# Patient Record
Sex: MALE | Race: NATIVE HAWAIIAN OR OTHER PACIFIC ISLANDER | URBAN - METROPOLITAN AREA
[De-identification: names, ages, dates, MRNs, and addresses within clinical notes are randomized per-mention and may not be internally consistent; named-entity substitution may affect disease eponyms.]

---

## 2019-04-18 ENCOUNTER — OFFICE VISIT (OUTPATIENT)
Dept: FAMILY MEDICINE | Facility: CLINIC | Age: 29
End: 2019-04-18
Payer: COMMERCIAL

## 2019-04-18 VITALS
DIASTOLIC BLOOD PRESSURE: 98 MMHG | SYSTOLIC BLOOD PRESSURE: 171 MMHG | HEIGHT: 66 IN | HEART RATE: 104 BPM | OXYGEN SATURATION: 96 % | TEMPERATURE: 99.2 F | BODY MASS INDEX: 50.62 KG/M2 | WEIGHT: 315 LBS

## 2019-04-18 DIAGNOSIS — F40.01 PANIC DISORDER WITH AGORAPHOBIA: ICD-10-CM

## 2019-04-18 DIAGNOSIS — E78.5 HYPERLIPIDEMIA LDL GOAL <130: ICD-10-CM

## 2019-04-18 DIAGNOSIS — I10 HYPERTENSION GOAL BP (BLOOD PRESSURE) < 140/90: Primary | ICD-10-CM

## 2019-04-18 DIAGNOSIS — F32.2 SEVERE MAJOR DEPRESSION (H): ICD-10-CM

## 2019-04-18 DIAGNOSIS — E66.01 MORBID OBESITY (H): ICD-10-CM

## 2019-04-18 DIAGNOSIS — R06.81 APNEA: ICD-10-CM

## 2019-04-18 LAB
ALBUMIN SERPL-MCNC: 3.9 G/DL (ref 3.4–5)
ALP SERPL-CCNC: 119 U/L (ref 40–150)
ALT SERPL W P-5'-P-CCNC: 85 U/L (ref 0–70)
ANION GAP SERPL CALCULATED.3IONS-SCNC: 6 MMOL/L (ref 3–14)
AST SERPL W P-5'-P-CCNC: 55 U/L (ref 0–45)
BILIRUB SERPL-MCNC: 0.5 MG/DL (ref 0.2–1.3)
BUN SERPL-MCNC: 8 MG/DL (ref 7–30)
CALCIUM SERPL-MCNC: 8.8 MG/DL (ref 8.5–10.1)
CHLORIDE SERPL-SCNC: 107 MMOL/L (ref 94–109)
CHOLEST SERPL-MCNC: 225 MG/DL
CO2 SERPL-SCNC: 26 MMOL/L (ref 20–32)
CREAT SERPL-MCNC: 0.91 MG/DL (ref 0.66–1.25)
ERYTHROCYTE [DISTWIDTH] IN BLOOD BY AUTOMATED COUNT: 14.5 % (ref 10–15)
GFR SERPL CREATININE-BSD FRML MDRD: >90 ML/MIN/{1.73_M2}
GLUCOSE SERPL-MCNC: 124 MG/DL (ref 70–99)
HCT VFR BLD AUTO: 47.4 % (ref 40–53)
HDLC SERPL-MCNC: 33 MG/DL
HGB BLD-MCNC: 15.7 G/DL (ref 13.3–17.7)
LDLC SERPL CALC-MCNC: 118 MG/DL
MCH RBC QN AUTO: 28.9 PG (ref 26.5–33)
MCHC RBC AUTO-ENTMCNC: 33.1 G/DL (ref 31.5–36.5)
MCV RBC AUTO: 87 FL (ref 78–100)
NONHDLC SERPL-MCNC: 192 MG/DL
PLATELET # BLD AUTO: 312 10E9/L (ref 150–450)
POTASSIUM SERPL-SCNC: 4.1 MMOL/L (ref 3.4–5.3)
PROT SERPL-MCNC: 8.2 G/DL (ref 6.8–8.8)
RBC # BLD AUTO: 5.44 10E12/L (ref 4.4–5.9)
SODIUM SERPL-SCNC: 139 MMOL/L (ref 133–144)
TRIGL SERPL-MCNC: 371 MG/DL
TSH SERPL DL<=0.005 MIU/L-ACNC: 2 MU/L (ref 0.4–4)
WBC # BLD AUTO: 9.5 10E9/L (ref 4–11)

## 2019-04-18 PROCEDURE — 99204 OFFICE O/P NEW MOD 45 MIN: CPT | Performed by: NURSE PRACTITIONER

## 2019-04-18 PROCEDURE — 85027 COMPLETE CBC AUTOMATED: CPT | Performed by: NURSE PRACTITIONER

## 2019-04-18 PROCEDURE — 36415 COLL VENOUS BLD VENIPUNCTURE: CPT | Performed by: NURSE PRACTITIONER

## 2019-04-18 PROCEDURE — 84443 ASSAY THYROID STIM HORMONE: CPT | Performed by: NURSE PRACTITIONER

## 2019-04-18 PROCEDURE — 80061 LIPID PANEL: CPT | Performed by: NURSE PRACTITIONER

## 2019-04-18 PROCEDURE — 80053 COMPREHEN METABOLIC PANEL: CPT | Performed by: NURSE PRACTITIONER

## 2019-04-18 RX ORDER — CLONAZEPAM 1 MG/1
.5-1 TABLET ORAL 2 TIMES DAILY PRN
Qty: 30 TABLET | Refills: 0 | Status: SHIPPED | OUTPATIENT
Start: 2019-04-18 | End: 2019-05-20

## 2019-04-18 RX ORDER — LISINOPRIL AND HYDROCHLOROTHIAZIDE 20; 25 MG/1; MG/1
1 TABLET ORAL DAILY
Qty: 30 TABLET | Refills: 1 | Status: SHIPPED | OUTPATIENT
Start: 2019-04-18 | End: 2019-06-07

## 2019-04-18 RX ORDER — PAROXETINE 20 MG/1
TABLET, FILM COATED ORAL
Qty: 63 TABLET | Refills: 0 | Status: SHIPPED | OUTPATIENT
Start: 2019-04-18 | End: 2019-05-02 | Stop reason: SINTOL

## 2019-04-18 ASSESSMENT — PAIN SCALES - GENERAL: PAINLEVEL: NO PAIN (0)

## 2019-04-18 ASSESSMENT — MIFFLIN-ST. JEOR: SCORE: 2476.63

## 2019-04-18 NOTE — PROGRESS NOTES
SUBJECTIVE:   Jeovanny Salcedo is a 28 year old male who presents to clinic today for the following   health issues:        He is here today with his momFya  They moved back in August. Grandma with terminal lung cancer  Mom is providing some of the history, he has social anxiety  He has been off of his medications  BP was never well controlled. Was on lisinopril, metoprolol, simvastatin, baby aspirin (not completely sure this is accurate)  Not diabetic  Primary care provider thought heart was enlarged. Saw cardiologist with normal workup   Family history of CAD  Concern for GLENNY. Did not have sleep study before moving  Mom notices apneic periods, a lot of snoring    History of anxiety and depression  On Fluoxetine and Clonazepam?  Anxiety around social situations. At grocery store, starts to panic, sweats a lot, raises blood pressure  Saw therapist in [place]    His older brother is disabled, more severe anxiety  They are staying with their aunt while their parents are living with grandma  Jeovanny helps care for his brother      Additional history: as documented    Reviewed  and updated as needed this visit by clinical staff  Tobacco  Allergies  Meds  Med Hx  Surg Hx  Fam Hx  Soc Hx        Reviewed and updated as needed this visit by Provider         Patient Active Problem List   Diagnosis     overweight HCC     Hypertension goal BP (blood pressure) < 140/90     Severe major depression (H)     Panic disorder with agoraphobia     History reviewed. No pertinent surgical history.    Social History     Tobacco Use     Smoking status: Never Smoker     Smokeless tobacco: Never Used   Substance Use Topics     Alcohol use: Yes     Comment: Occ.     Family History   Problem Relation Age of Onset     Hypertension Maternal Grandfather      Diabetes Maternal Grandfather      Lung Cancer Maternal Grandfather      Anxiety Disorder Brother      Cervical Cancer Paternal Grandmother          BP Readings from Last 3 Encounters:  "  04/18/19 (!) 171/98    Wt Readings from Last 3 Encounters:   04/18/19 (!) 156.5 kg (345 lb)                    ROS:  10 point ROS of systems including Constitutional, Eyes, Respiratory, Cardiovascular, Gastroenterology, Genitourinary, Integumentary, Muscularskeletal, Psychiatric were all negative except for pertinent positives noted in my HPI.      OBJECTIVE:     BP (!) 171/98 (BP Location: Right arm, Patient Position: Chair, Cuff Size: Adult Large)   Pulse 104   Temp 99.2  F (37.3  C) (Oral)   Ht 1.683 m (5' 6.25\")   Wt (!) 156.5 kg (345 lb)   SpO2 96%   BMI 55.26 kg/m    Body mass index is 55.26 kg/m .  GENERAL: healthy, alert and no distress  NECK: no adenopathy, no asymmetry, masses, or scars and thyroid normal to palpation  RESP: lungs clear to auscultation - no rales, rhonchi or wheezes  CV: regular rate and rhythm, normal S1 S2, no S3 or S4, no murmur, click or rub, no peripheral edema and peripheral pulses strong  ABDOMEN: large, nontender, bowel sounds distant  MS: no gross musculoskeletal defects noted, no edema  PSYCH: Flat affect, few words, appears anxious. Mom initially provided most of history. As visit progressed he appeared to become more comfortable speaking and gave more historical information    Diagnostic Test Results:  none     ASSESSMENT/PLAN:   1. Hypertension goal BP (blood pressure) < 140/90  - Start medication. Consider BB with tachycardia though that they may improve as we improve anxiety. Will wait until I receive previous records before starting BB, or consider in future if tachycardia does not improve  - Comprehensive metabolic panel (BMP + Alb, Alk Phos, ALT, AST, Total. Bili, TP)  - CBC with platelets  - TSH with free T4 reflex  - lisinopril-hydrochlorothiazide (PRINZIDE/ZESTORETIC) 20-25 MG tablet; Take 1 tablet by mouth daily  Dispense: 30 tablet; Refill: 1    2. overweight HCC  - Recommend exercising 150 minutes/week. Dietary modifications    3. Apnea  - SLEEP EVALUATION & " MANAGEMENT REFERRAL - ADULT -[facility] - [facility]  [phone] (Age 15 and up); Future    4. Severe major depression (H)  - Recommend paxil which tends to be more effective for anxiety, though monitor for weight gain as potential side effect. After I suggested Paxil mom thought maybe he was on that instead of Prozac  - Normally would not recommend starting benzodiazepine but his anxiety appears severe and I think he could really benefit from it. Especially while establishing with a therapist and starting on serotonin specific reuptake inhibitor. Reviewed risks and benefits including risk for addiction and habit formation. Will not be escalating dose  - PARoxetine (PAXIL) 20 MG tablet; Take 1 tablet daily for 1 week, then 2 tablets daily for 1 week then 3 tablets daily for 2 weeks.  Dispense: 63 tablet; Refill: 0  - clonazePAM (KLONOPIN) 1 MG tablet; Take 0.5-1 tablets (0.5-1 mg) by mouth 2 times daily as needed for anxiety  Dispense: 30 tablet; Refill: 0  - MENTAL HEALTH REFERRAL  - Adult; Outpatient Treatment; Individual/Couples/Family/Group Therapy/Health Psychology; Other: [facility] [phone]; We will contact you to schedule the appointment or please call with any questi...    5. Panic disorder with agoraphobia  - PARoxetine (PAXIL) 20 MG tablet; Take 1 tablet daily for 1 week, then 2 tablets daily for 1 week then 3 tablets daily for 2 weeks.  Dispense: 63 tablet; Refill: 0  - clonazePAM (KLONOPIN) 1 MG tablet; Take 0.5-1 tablets (0.5-1 mg) by mouth 2 times daily as needed for anxiety  Dispense: 30 tablet; Refill: 0  - MENTAL HEALTH REFERRAL  - Adult; Outpatient Treatment; Individual/Couples/Family/Group Therapy/Health Psychology; Other: [facility] [phone]; We will contact you to schedule the appointment or please call with any questi...    6. Hyperlipidemia LDL goal <130  - Lipid panel reflex to direct LDL Non-fasting    Mom completed ROIs  for previous records    Patient Instructions   Schedule appointment at sleep clinic    You will receive a call to schedule with a therapist.    Follow up with me in 2 weeks    Try to start exercising. It would be great to go outside on walks and get some fresh air    More than 45 minutes spent with patient, more than 50% of which was spent on counseling and coordination of care.     SLOAN Mcconnell [facility]

## 2019-04-18 NOTE — PATIENT INSTRUCTIONS
Schedule appointment at sleep clinic    You will receive a call to schedule with a therapist.    Follow up with me in 2 weeks    Try to start exercising. It would be great to go outside on walks and get some fresh air

## 2019-04-18 NOTE — LETTER
[facility]  [address] [place]  [address]  [phone]        April 22, 2019    Jeovanny Augustinson  [address] [facility] [address]        Dear Jeovanny,    The results of your recent labs are enclosed.   Your cholesterol is elevated but it is not a true fasting lab check.   Your liver enzymes are just mildly elevated. We can recheck this at your follow up appointment. Avoid any use of tylenol or alcohol. If you're able to, come fasting (nothing to eat or drink for 8 hours) so we can recheck fasting cholesterol. It is ok to drink water with your morning medications.     Please call the clinic if you have any concerns.     Results for orders placed or performed in visit on 04/18/19   Comprehensive metabolic panel (BMP + Alb, Alk Phos, ALT, AST, Total. Bili, TP)   Result Value Ref Range    Sodium 139 133 - 144 mmol/L    Potassium 4.1 3.4 - 5.3 mmol/L    Chloride 107 94 - 109 mmol/L    Carbon Dioxide 26 20 - 32 mmol/L    Anion Gap 6 3 - 14 mmol/L    Glucose 124 (H) 70 - 99 mg/dL    Urea Nitrogen 8 7 - 30 mg/dL    Creatinine 0.91 0.66 - 1.25 mg/dL    GFR Estimate >90 >60 mL/min/[1.73_m2]    GFR Estimate If Black >90 >60 mL/min/[1.73_m2]    Calcium 8.8 8.5 - 10.1 mg/dL    Bilirubin Total 0.5 0.2 - 1.3 mg/dL    Albumin 3.9 3.4 - 5.0 g/dL    Protein Total 8.2 6.8 - 8.8 g/dL    Alkaline Phosphatase 119 40 - 150 U/L    ALT 85 (H) 0 - 70 U/L    AST 55 (H) 0 - 45 U/L   CBC with platelets   Result Value Ref Range    WBC 9.5 4.0 - 11.0 10e9/L    RBC Count 5.44 4.4 - 5.9 10e12/L    Hemoglobin 15.7 13.3 - 17.7 g/dL    Hematocrit 47.4 40.0 - 53.0 %    MCV 87 78 - 100 fl    MCH 28.9 26.5 - 33.0 pg    MCHC 33.1 31.5 - 36.5 g/dL    RDW 14.5 10.0 - 15.0 %    Platelet Count 312 150 - 450 10e9/L   TSH with free T4 reflex   Result Value Ref Range    TSH 2.00 0.40 - 4.00 mU/L   Lipid panel reflex to direct LDL Non-fasting   Result Value Ref Range    Cholesterol 225 (H) <200 mg/dL     Triglycerides 371 (H) <150 mg/dL    HDL Cholesterol 33 (L) >39 mg/dL    LDL Cholesterol Calculated 118 (H) <100 mg/dL    Non HDL Cholesterol 192 (H) <130 mg/dL       If you have any questions please call the clinic at [phone].    Sincerely,    Padmini LISA CNP  LMD

## 2019-04-22 NOTE — RESULT ENCOUNTER NOTE
[address] [address]  Phone: [phone]  Fax: [phone]      04/22/19    Jeovanny Salcedo  [facility] [address] [facility] [address]      Dear Jeovanny,    The results of your recent labs are enclosed.  Your cholesterol is elevated but it is not a true fasting lab check.   Your liver enzymes are just mildly elevated. We can recheck this at your follow up appointment. Avoid any use of tylenol or alcohol. If you're able to, come fasting (nothing to eat or drink for 8 hours) so we can recheck fasting cholesterol. It is ok to drink water with your morning medications.     Please call the clinic if you have any concerns.    Sincerely,    SLOAN Mcconnell CNP    [facility]

## 2019-05-01 ENCOUNTER — TRANSFERRED RECORDS (OUTPATIENT)
Dept: HEALTH INFORMATION MANAGEMENT | Facility: CLINIC | Age: 29
End: 2019-05-01

## 2019-05-01 LAB
ALT SERPL-CCNC: 136 IU/L (ref 8–45)
AST SERPL-CCNC: 101 IU/L (ref 2–40)
CREAT SERPL-MCNC: 1.36 MG/DL (ref 0.72–1.25)
GFR SERPL CREATININE-BSD FRML MDRD: >60 ML/MIN/1.73M2
GLUCOSE SERPL-MCNC: 140 MG/DL (ref 65–100)
POTASSIUM SERPL-SCNC: 3.8 MMOL/L (ref 3.5–5)

## 2019-05-02 ENCOUNTER — OFFICE VISIT (OUTPATIENT)
Dept: FAMILY MEDICINE | Facility: CLINIC | Age: 29
End: 2019-05-02
Payer: COMMERCIAL

## 2019-05-02 VITALS
BODY MASS INDEX: 54.14 KG/M2 | HEART RATE: 99 BPM | WEIGHT: 315 LBS | SYSTOLIC BLOOD PRESSURE: 116 MMHG | DIASTOLIC BLOOD PRESSURE: 78 MMHG | TEMPERATURE: 98.6 F | OXYGEN SATURATION: 95 %

## 2019-05-02 DIAGNOSIS — F32.2 SEVERE MAJOR DEPRESSION (H): Primary | ICD-10-CM

## 2019-05-02 DIAGNOSIS — E78.2 MIXED HYPERLIPIDEMIA: ICD-10-CM

## 2019-05-02 DIAGNOSIS — I10 HYPERTENSION GOAL BP (BLOOD PRESSURE) < 140/90: ICD-10-CM

## 2019-05-02 DIAGNOSIS — K21.9 GASTROESOPHAGEAL REFLUX DISEASE, ESOPHAGITIS PRESENCE NOT SPECIFIED: ICD-10-CM

## 2019-05-02 DIAGNOSIS — F51.04 PSYCHOPHYSIOLOGICAL INSOMNIA: ICD-10-CM

## 2019-05-02 DIAGNOSIS — F40.01 PANIC DISORDER WITH AGORAPHOBIA: ICD-10-CM

## 2019-05-02 DIAGNOSIS — R74.01 ELEVATED TRANSAMINASE LEVEL: ICD-10-CM

## 2019-05-02 DIAGNOSIS — R94.31 PROLONGED Q-T INTERVAL ON ECG: ICD-10-CM

## 2019-05-02 PROCEDURE — 99215 OFFICE O/P EST HI 40 MIN: CPT | Performed by: NURSE PRACTITIONER

## 2019-05-02 RX ORDER — ZOLPIDEM TARTRATE 5 MG/1
5 TABLET ORAL
Qty: 30 TABLET | Refills: 1 | Status: SHIPPED | OUTPATIENT
Start: 2019-05-02 | End: 2019-06-07

## 2019-05-02 ASSESSMENT — ANXIETY QUESTIONNAIRES
6. BECOMING EASILY ANNOYED OR IRRITABLE: MORE THAN HALF THE DAYS
1. FEELING NERVOUS, ANXIOUS, OR ON EDGE: MORE THAN HALF THE DAYS
5. BEING SO RESTLESS THAT IT IS HARD TO SIT STILL: SEVERAL DAYS
2. NOT BEING ABLE TO STOP OR CONTROL WORRYING: SEVERAL DAYS
3. WORRYING TOO MUCH ABOUT DIFFERENT THINGS: MORE THAN HALF THE DAYS
7. FEELING AFRAID AS IF SOMETHING AWFUL MIGHT HAPPEN: NEARLY EVERY DAY
IF YOU CHECKED OFF ANY PROBLEMS ON THIS QUESTIONNAIRE, HOW DIFFICULT HAVE THESE PROBLEMS MADE IT FOR YOU TO DO YOUR WORK, TAKE CARE OF THINGS AT HOME, OR GET ALONG WITH OTHER PEOPLE: VERY DIFFICULT
GAD7 TOTAL SCORE: 13

## 2019-05-02 ASSESSMENT — PATIENT HEALTH QUESTIONNAIRE - PHQ9
SUM OF ALL RESPONSES TO PHQ QUESTIONS 1-9: 15
5. POOR APPETITE OR OVEREATING: MORE THAN HALF THE DAYS

## 2019-05-02 ASSESSMENT — PAIN SCALES - GENERAL: PAINLEVEL: NO PAIN (0)

## 2019-05-02 NOTE — PATIENT INSTRUCTIONS
If you develop chest pain or shortness of breath with physical activity (like walking stairs) please let me know and I will order a stress test    I sent a prescription for Prilosec. Take 1 capsule every morning 30 minutes before breakfast. Take this for 25 days, then take every other day until its done and then stop     Please schedule a lab only appointment for fasting lab within the next 1-2 weeks  Fasting means nothing to eat or drink for 8 hours. Water is ok with your medications, otherwise nothing else to drink    I am going to wean you off of Paxil because I'm concerned it has changed your heart rhythm  I started you on a new medication called Zoloft which is safer  Take 1/2 table Zoloft daily for 1 week then increase to 1 tablet daily  Take 2 tablets Paxil at bedtime for 3 days then 1 tablet at bedtime for 3 days, then stop     If you keep quitting the sweats after taking Paxil at night (and you will stop this medication in 6 days) call me and I may switch you to a different blood pressure medication    Follow up in 1 month          GERD (Adult)    The esophagus is a tube that carries food from the mouth to the stomach. A valve (the LES, lower esophageal sphincter) at the lower end of the esophagus prevents stomach acid from flowing upward. When this valve doesn't work properly, stomach contents may repeatedly flow back up (reflux) into the esophagus. This is called gastroesophageal reflux disease (GERD). GERD can irritate the esophagus. It can cause problems with pain, swallowing or breathing. In severe cases, GERD can cause recurrent pneumonia (from aspiration or breathing in particles) or other serious problems.  Symptoms of reflux include burning, pressure or sharp pain in the upper abdomen or mid to lower chest. The pain can spread to the neck, back, or shoulder. There may be belching, an acid taste in the back of the throat, chronic cough, or sore throat, or hoarseness. GERD symptoms often occur during  "the day after a big meal. They can also occur at night when lying down.   Home care  Lifestyle changes can help reduce symptoms. If needed, your healthcare provider may prescribe medicines. Symptoms often improve with treatment, but if treatment is stopped, the symptoms often return after a few months. So most persons with GERD will need to continue treatment or get treatment on and off.  Lifestyle changes    Limit or avoid fatty, fried, and spicy foods, as well as coffee, chocolate, mint, and foods with high acid content such as tomatoes and citrus fruit and juices (orange, grapefruit, lemon).    Don t eat large meals, especially at night. Frequent, smaller meals are best. Don't lie down right after eating. And don t eat anything 3 hours before going to bed.    Don't drink alcohol or smoke. As much as possible, stay away from second hand smoke.    If you are overweight, losing weight will reduce symptoms.     Don't wear tight clothing around your stomach area.    If your symptoms occur during sleep, use a foam wedge to elevate your upper body (not just your head.) Or, place 4\" blocks under the head of your bed. Or use 2 bed risers under your bedframe.  Medicines  If needed, medicines can help relieve the symptoms of GERD and prevent damage to the esophagus. Discuss a medicine plan with your healthcare provider. This may include one or more of the following medicines:    Antacids to help neutralize the normal acids in your stomach.    Acid blockers (Histamine or H2 blockers) to decrease acid production.    Acid inhibitors (proton pump inhibitors PPIs) to decrease acid production in a different way than the blockers. They may work better, but can take a little longer to take effect.  Take an antacid 30 to 60 minutes after eating and at bedtime, but not at the same time as an acid blocker.Try not to take medicines such as ibuprofen and aspirin. If you are taking aspirin for your heart or other medical reasons, talk to " your healthcare provider about stopping it.  Follow-up care  Follow up with your healthcare provider or as advised by our staff.  When to seek medical advice  Call your healthcare provider if any of the following occur:    Stomach pain gets worse or moves to the lower right abdomen (appendix area)    Chest pain appears or gets worse, or spreads to the back, neck, shoulder, or arm    An over-the-counter trial of medicine doesn't relieve your symptoms    Weight loss that can't be explained    Trouble or pain swallowing    Frequent vomiting (can t keep down liquids)    Blood in the stool or vomit (red or black in color)    Feeling weak or dizzy    Fever of 100.4 F (38 C) or higher, or as directed by your healthcare provider  Date Last Reviewed: 3/1/2018    3458-9192 [employer]. [address] [place] [address], [place] [address]. All rights reserved. This information is not intended as a substitute for professional medical care. Always follow your healthcare professional's instructions.           Patient Education     Lifestyle Changes for Controlling GERD  When you have GERD, stomach acid feels as if it s backing up toward your mouth. Whether or not you take medicine to control your GERD, your symptoms can often be improved with lifestyle changes. Talk to your healthcare provider about the following suggestions. These suggestions may help you get relief from your symptoms.      Raise your head  Reflux is more likely to strike when you re lying down flat, because stomach fluid can flow backward more easily. Raising the head of your bed 4 to 6 inches can help. To do this:    Slide blocks or books under the legs at the head of your bed. Or, place a wedge under the mattress. [employer] can make a suitable wedge for you. The wedge should run from your waist to the top of your head.    Don t just prop your head on several pillows. This increases pressure on your stomach. It can make GERD worse.  Watch your eating  habits  Certain foods may increase the acid in your stomach or relax the lower esophageal sphincter. This makes GERD more likely. It s best to avoid the following if they cause you symptoms:    Coffee, tea, and carbonated drinks (with and without caffeine)    Fatty, fried, or spicy food    Mint, chocolate, onions, and tomatoes    Peppermint    Any other foods that seem to irritate your stomach or cause you pain  Relieve the pressure  Tips include the following:    Eat smaller meals, even if you have to eat more often.    Don t lie down right after you eat. Wait a few hours for your stomach to empty.    Avoid tight belts and tight-fitting clothes.    Lose excess weight.  Tobacco and alcohol  Avoid smoking tobacco and drinking alcohol. They can make GERD symptoms worse.  Date Last Reviewed: 7/1/2016 2000-2018 [employer]. [address] [place] [address], [place] [address]. All rights reserved. This information is not intended as a substitute for professional medical care. Always follow your healthcare professional's instructions.

## 2019-05-02 NOTE — PROGRESS NOTES
"  SUBJECTIVE:   Jeovanny Salcedo is a 28 year old male who presents to clinic today for the following   health issues:      Depression and Anxiety Follow-Up    Status since last visit: Improved     Other associated symptoms:None    Complicating factors:     Significant life event: No     Current substance abuse: None    PHQ 5/2/2019   PHQ-9 Total Score 15   Q9: Thoughts of better off dead/self-harm past 2 weeks Several days     GIBSON-7 SCORE 5/2/2019   Total Score 13         Amount of exercise or physical activity: None    Problems taking medications regularly: Yes,      Medication side effects: Cold sweats    Diet: regular (no restrictions) and but cut out pop    I first met Jeovanny 4/18/19 to establish care  Started on Paxil for depression and anxiety and Klonopin BID which he had been on previously  Transaminases were mildly elevated at last visit, Triglycerides 371. Was not a true fasting lab  Does not drink alcohol  He was started on lisinopril-hydrochlorothiazide for HTN. Has developed \"cold sweats\" and not sure if this is a side effect of medication  He was seen at ED yesterday for chest pain. Developed after eating \"unhealthy lungch\"  ECG showed QTc 559, other normal  Chest xray normal  He reports chest pain resolved after the GI cocktail and has not returned since then  He has walked stairs today without SOB  Maternal grandfather had heart disease. Not sure at what age  He was given Prilosec at discharge but did not fill  He had an appointment with a therapist, Bonnie Newberry  Next visit schedule tomorrow  He asks for something for sleep  Was on ambien in the past which was helpful  Tolerated without SE  Tried taking 1/2 tablet klonopin in the morning and afternoon but find better relief with taking 1 tablet in afternoon        Additional history: as documented    Reviewed  and updated as needed this visit by clinical staff  Tobacco  Allergies  Meds  Med Hx  Surg Hx  Fam Hx  Soc Hx        Reviewed and " updated as needed this visit by Provider         Patient Active Problem List   Diagnosis     overweight HCC     Hypertension goal BP (blood pressure) < 140/90     Severe major depression (H)     Panic disorder with agoraphobia     History reviewed. No pertinent surgical history.    Social History     Tobacco Use     Smoking status: Never Smoker     Smokeless tobacco: Never Used   Substance Use Topics     Alcohol use: Yes     Comment: Occ.     Family History   Problem Relation Age of Onset     Hypertension Maternal Grandfather      Diabetes Maternal Grandfather      Lung Cancer Maternal Grandfather      Anxiety Disorder Brother      Cervical Cancer Paternal Grandmother          BP Readings from Last 3 Encounters:   05/02/19 116/78   04/18/19 (!) 171/98    Wt Readings from Last 3 Encounters:   05/02/19 (!) 153.3 kg (338 lb)   04/18/19 (!) 156.5 kg (345 lb)                    ROS:  Constitutional, HEENT, cardiovascular, pulmonary, gi and gu systems are negative, except as otherwise noted.    OBJECTIVE:     /78 (BP Location: Right arm, Patient Position: Chair, Cuff Size: Adult Regular)   Pulse 99   Temp 98.6  F (37  C) (Oral)   Wt (!) 153.3 kg (338 lb)   SpO2 95%   BMI 54.14 kg/m    Body mass index is 54.14 kg/m .  GENERAL: healthy, alert and no distress  RESP: lungs clear to auscultation - no rales, rhonchi or wheezes  CV: regular rate and rhythm, normal S1 S2, no S3 or S4, no murmur, click or rub, no peripheral edema and peripheral pulses strong  ABDOMEN: large, soft, no tenderness  PSYCH: mentation normal, flat affect, appears less anxious than previous, he appears more comfortable in clinic today    Diagnostic Test Results:  Results for orders placed or performed in visit on 04/18/19   Comprehensive metabolic panel (BMP + Alb, Alk Phos, ALT, AST, Total. Bili, TP)   Result Value Ref Range    Sodium 139 133 - 144 mmol/L    Potassium 4.1 3.4 - 5.3 mmol/L    Chloride 107 94 - 109 mmol/L    Carbon Dioxide 26  20 - 32 mmol/L    Anion Gap 6 3 - 14 mmol/L    Glucose 124 (H) 70 - 99 mg/dL    Urea Nitrogen 8 7 - 30 mg/dL    Creatinine 0.91 0.66 - 1.25 mg/dL    GFR Estimate >90 >60 mL/min/[1.73_m2]    GFR Estimate If Black >90 >60 mL/min/[1.73_m2]    Calcium 8.8 8.5 - 10.1 mg/dL    Bilirubin Total 0.5 0.2 - 1.3 mg/dL    Albumin 3.9 3.4 - 5.0 g/dL    Protein Total 8.2 6.8 - 8.8 g/dL    Alkaline Phosphatase 119 40 - 150 U/L    ALT 85 (H) 0 - 70 U/L    AST 55 (H) 0 - 45 U/L   CBC with platelets   Result Value Ref Range    WBC 9.5 4.0 - 11.0 10e9/L    RBC Count 5.44 4.4 - 5.9 10e12/L    Hemoglobin 15.7 13.3 - 17.7 g/dL    Hematocrit 47.4 40.0 - 53.0 %    MCV 87 78 - 100 fl    MCH 28.9 26.5 - 33.0 pg    MCHC 33.1 31.5 - 36.5 g/dL    RDW 14.5 10.0 - 15.0 %    Platelet Count 312 150 - 450 10e9/L   TSH with free T4 reflex   Result Value Ref Range    TSH 2.00 0.40 - 4.00 mU/L   Lipid panel reflex to direct LDL Non-fasting   Result Value Ref Range    Cholesterol 225 (H) <200 mg/dL    Triglycerides 371 (H) <150 mg/dL    HDL Cholesterol 33 (L) >39 mg/dL    LDL Cholesterol Calculated 118 (H) <100 mg/dL    Non HDL Cholesterol 192 (H) <130 mg/dL       ASSESSMENT/PLAN:       ICD-10-CM    1. Severe major depression (H) F32.2 sertraline (ZOLOFT) 50 MG tablet   2. Panic disorder with agoraphobia F40.01    3. Hypertension goal BP (blood pressure) < 140/90 I10    4. Elevated transaminase level R74.0 Comprehensive metabolic panel (BMP + Alb, Alk Phos, ALT, AST, Total. Bili, TP)     **Hepatitis C Screen Reflex to RNA FUTURE anytime     Hepatitis B core antibody     Hepatitis B Surface Antibody     Hepatitis B surface antigen   5. Prolonged Q-T interval on ECG R94.31    6. Mixed hyperlipidemia E78.2 Lipid panel reflex to direct LDL Fasting   7. Gastroesophageal reflux disease, esophagitis presence not specified K21.9 omeprazole (PRILOSEC) 20 MG DR capsule   8. Psychophysiological insomnia F51.04 zolpidem (AMBIEN) 5 MG tablet     Suspect elevated  "transaminases related to fatty liver  Discussed importance of healthy diet, weight loss  Consider US in future  I suspect \"cold sweats\" r/t Paxil. Need to discontinue d/t prolonged QT and will recheck ECG at f/u  No further chest pain and will closely monitor. Consider stress testing in future    Patient instructions:  If you develop chest pain or shortness of breath with physical activity (like walking stairs) please let me know and I will order a stress test    I sent a prescription for Prilosec. Take 1 capsule every morning 30 minutes before breakfast. Take this for 25 days, then take every other day until its done and then stop     Please schedule a lab only appointment for fasting lab within the next 1-2 weeks  Fasting means nothing to eat or drink for 8 hours. Water is ok with your medications, otherwise nothing else to drink    I am going to wean you off of Paxil because I'm concerned it has changed your heart rhythm  I started you on a new medication called Zoloft which is safer  Take 1/2 table Zoloft daily for 1 week then increase to 1 tablet daily  Take 2 tablets Paxil at bedtime for 3 days then 1 tablet at bedtime for 3 days, then stop     If you keep quitting the sweats after taking Paxil at night (and you will stop this medication in 6 days) call me and I may switch you to a different blood pressure medication    Follow up in 1 month    More than 45 minutes spent with patient, more than 50% of which was spent on counseling and coordination of care.       SLOAN Mcconnell [facility]      "

## 2019-05-03 ASSESSMENT — ANXIETY QUESTIONNAIRES: GAD7 TOTAL SCORE: 13

## 2019-05-20 DIAGNOSIS — F32.2 SEVERE MAJOR DEPRESSION (H): ICD-10-CM

## 2019-05-20 DIAGNOSIS — E78.2 MIXED HYPERLIPIDEMIA: ICD-10-CM

## 2019-05-20 DIAGNOSIS — F40.01 PANIC DISORDER WITH AGORAPHOBIA: ICD-10-CM

## 2019-05-20 DIAGNOSIS — R74.01 ELEVATED TRANSAMINASE LEVEL: ICD-10-CM

## 2019-05-20 LAB
ALBUMIN SERPL-MCNC: 3.9 G/DL (ref 3.4–5)
ALP SERPL-CCNC: 129 U/L (ref 40–150)
ALT SERPL W P-5'-P-CCNC: 73 U/L (ref 0–70)
ANION GAP SERPL CALCULATED.3IONS-SCNC: 9 MMOL/L (ref 3–14)
AST SERPL W P-5'-P-CCNC: 40 U/L (ref 0–45)
BILIRUB SERPL-MCNC: 0.3 MG/DL (ref 0.2–1.3)
BUN SERPL-MCNC: 15 MG/DL (ref 7–30)
CALCIUM SERPL-MCNC: 9.2 MG/DL (ref 8.5–10.1)
CHLORIDE SERPL-SCNC: 107 MMOL/L (ref 94–109)
CHOLEST SERPL-MCNC: 221 MG/DL
CO2 SERPL-SCNC: 23 MMOL/L (ref 20–32)
CREAT SERPL-MCNC: 1.06 MG/DL (ref 0.66–1.25)
GFR SERPL CREATININE-BSD FRML MDRD: >90 ML/MIN/{1.73_M2}
GLUCOSE SERPL-MCNC: 98 MG/DL (ref 70–99)
HDLC SERPL-MCNC: 38 MG/DL
LDLC SERPL CALC-MCNC: 146 MG/DL
NONHDLC SERPL-MCNC: 183 MG/DL
POTASSIUM SERPL-SCNC: 3.9 MMOL/L (ref 3.4–5.3)
PROT SERPL-MCNC: 8.3 G/DL (ref 6.8–8.8)
SODIUM SERPL-SCNC: 139 MMOL/L (ref 133–144)
TRIGL SERPL-MCNC: 186 MG/DL

## 2019-05-20 PROCEDURE — 87340 HEPATITIS B SURFACE AG IA: CPT | Performed by: NURSE PRACTITIONER

## 2019-05-20 PROCEDURE — 80053 COMPREHEN METABOLIC PANEL: CPT | Performed by: NURSE PRACTITIONER

## 2019-05-20 PROCEDURE — 86803 HEPATITIS C AB TEST: CPT | Performed by: NURSE PRACTITIONER

## 2019-05-20 PROCEDURE — 86706 HEP B SURFACE ANTIBODY: CPT | Performed by: NURSE PRACTITIONER

## 2019-05-20 PROCEDURE — 86704 HEP B CORE ANTIBODY TOTAL: CPT | Performed by: NURSE PRACTITIONER

## 2019-05-20 PROCEDURE — 80061 LIPID PANEL: CPT | Performed by: NURSE PRACTITIONER

## 2019-05-20 PROCEDURE — 36415 COLL VENOUS BLD VENIPUNCTURE: CPT | Performed by: NURSE PRACTITIONER

## 2019-05-20 RX ORDER — CLONAZEPAM 1 MG/1
TABLET ORAL
Qty: 30 TABLET | Refills: 0 | Status: SHIPPED | OUTPATIENT
Start: 2019-05-20 | End: 2019-06-07

## 2019-05-20 NOTE — LETTER
[facility]  [address] [facility] [place]  [address]  [phone]        May 21, 2019    Jeovanny Augustinson  [address] [facility] [address]        Dear Jeovanny,    The results of your recent labs are enclosed.   Your liver enzyme has improved and has nearly normalized.   Your cholesterol looks better now that we checked a true fasting lab. It is still elevated though. Work on eating less processed sugar, saturated fats and increase exercise.   You are immune to hepatitis B. Either through previous vaccination or exposure. Follow up with me early June     Please call the clinic if you have any concerns.     Results for orders placed or performed in visit on 05/20/19   Hepatitis B surface antigen   Result Value Ref Range    Hep B Surface Agn Nonreactive NR^Nonreactive   Hepatitis B Surface Antibody   Result Value Ref Range    Hepatitis B Surface Antibody 13.66 (H) <8.00 m[IU]/mL   Hepatitis B core antibody   Result Value Ref Range    Hepatitis B Core Rosie Nonreactive NR^Nonreactive   **Hepatitis C Screen Reflex to RNA FUTURE anytime   Result Value Ref Range    Hepatitis C Antibody Nonreactive NR^Nonreactive   Lipid panel reflex to direct LDL Fasting   Result Value Ref Range    Cholesterol 221 (H) <200 mg/dL    Triglycerides 186 (H) <150 mg/dL    HDL Cholesterol 38 (L) >39 mg/dL    LDL Cholesterol Calculated 146 (H) <100 mg/dL    Non HDL Cholesterol 183 (H) <130 mg/dL   Comprehensive metabolic panel (BMP + Alb, Alk Phos, ALT, AST, Total. Bili, TP)   Result Value Ref Range    Sodium 139 133 - 144 mmol/L    Potassium 3.9 3.4 - 5.3 mmol/L    Chloride 107 94 - 109 mmol/L    Carbon Dioxide 23 20 - 32 mmol/L    Anion Gap 9 3 - 14 mmol/L    Glucose 98 70 - 99 mg/dL    Urea Nitrogen 15 7 - 30 mg/dL    Creatinine 1.06 0.66 - 1.25 mg/dL    GFR Estimate >90 >60 mL/min/[1.73_m2]    GFR Estimate If Black >90 >60 mL/min/[1.73_m2]    Calcium 9.2 8.5 - 10.1 mg/dL    Bilirubin Total 0.3 0.2 - 1.3  mg/dL    Albumin 3.9 3.4 - 5.0 g/dL    Protein Total 8.3 6.8 - 8.8 g/dL    Alkaline Phosphatase 129 40 - 150 U/L    ALT 73 (H) 0 - 70 U/L    AST 40 0 - 45 U/L       If you have any questions please call the clinic at [phone].    Sincerely,    Padmini LISA CNP  LMD

## 2019-05-20 NOTE — TELEPHONE ENCOUNTER
Requested Prescriptions   Pending Prescriptions Disp Refills     clonazePAM (KLONOPIN) 1 MG tablet [Pharmacy Med Name: CLONAZEPAM 1MG TABLETS] 30 tablet 0     Sig: TAKE 1/2 TO 1 TABLET BY MOUTH TWICE DAILY AS NEEDED FOR ANXIETY       There is no refill protocol information for this order        Routing to Jung.  Brenna Browne MA

## 2019-05-21 LAB
HBV CORE AB SERPL QL IA: NONREACTIVE
HBV SURFACE AB SERPL IA-ACNC: 13.66 M[IU]/ML
HBV SURFACE AG SERPL QL IA: NONREACTIVE
HCV AB SERPL QL IA: NONREACTIVE

## 2019-05-21 NOTE — RESULT ENCOUNTER NOTE
[address] [place] [address]  Phone: [phone]      05/21/19    Jeovanny Salcedo  [address] [place] [address]      Dear Jeovanny,    The results of your recent labs are enclosed.  Your liver enzyme has improved and has nearly normalized.  Your cholesterol looks better now that we checked a true fasting lab. It is still elevated though. Work on eating less processed sugar, saturated fats and increase exercise.  You are immune to hepatitis B. Either through previous vaccination or exposure. Follow up with me early June    Please call the clinic if you have any concerns.    Sincerely,    SLOAN Mcconnell CNP    Your [facility] Care Team

## 2019-05-30 DIAGNOSIS — F32.2 SEVERE MAJOR DEPRESSION (H): ICD-10-CM

## 2019-05-30 DIAGNOSIS — F40.01 PANIC DISORDER WITH AGORAPHOBIA: ICD-10-CM

## 2019-05-30 NOTE — TELEPHONE ENCOUNTER
Routing refill request to provider for review/approval because:  Drug not on the [facility] refill protocol       Danyell Cuello RN  [facility]

## 2019-05-30 NOTE — TELEPHONE ENCOUNTER
Requested Prescriptions   Pending Prescriptions Disp Refills     clonazePAM (KLONOPIN) 1 MG tablet [Pharmacy Med Name: CLONAZEPAM 1MG TABLETS] 30 tablet 0     Sig: TAKE ONE-HALF TO ONE TABLET BY MOUTH TWICE DAILY AS NEEDED FOR ANXIETY       There is no refill protocol information for this order        Last Written Prescription Date:  5/20/19  Last Fill Quantity: 30,  # refills: 0   Last office visit: 5/2/2019 with prescribing provider:  Jung     Future Office Visit:

## 2019-05-31 RX ORDER — CLONAZEPAM 1 MG/1
TABLET ORAL
Qty: 30 TABLET | Refills: 0 | OUTPATIENT
Start: 2019-05-31

## 2019-05-31 NOTE — TELEPHONE ENCOUNTER
Refill request denied  This was last refilled 5/20/19 and 30 tablets was supposed to be a 1 month supply  As we discussed at your visit, this medication is to be used sparingly while we wait for daily medication to reach therapeutic level  Due to follow up in clinic next week and we can discuss this further

## 2019-06-05 DIAGNOSIS — K21.9 GASTROESOPHAGEAL REFLUX DISEASE, ESOPHAGITIS PRESENCE NOT SPECIFIED: ICD-10-CM

## 2019-06-05 NOTE — TELEPHONE ENCOUNTER
"Requested Prescriptions   Pending Prescriptions Disp Refills     omeprazole (PRILOSEC) 20 MG DR capsule [Pharmacy Med Name: OMEPRAZOLE 20MG CAPSULES] 30 capsule 0     Sig: TAKE ONE CAPSULE BY MOUTH EVERY DAY   Last Written Prescription Date:  5/2/19  Last Fill Quantity: 30,  # refills: 0   Last office visit: 5/2/2019 with prescribing provider:     Future Office Visit:   Next 5 appointments (look out 90 days)    Jun 07, 2019  1:20 PM CDT  Office Visit with SLOAN Purcell CNP  [facility] ([facility] [address] [place] [id number]  [phone]             PPI Protocol Passed - 6/5/2019  2:27 PM        Passed - Not on Clopidogrel (unless Pantoprazole ordered)        Passed - No diagnosis of osteoporosis on record        Passed - Recent (12 mo) or future (30 days) visit within the authorizing provider's specialty     Patient had office visit in the last 12 months or has a visit in the next 30 days with authorizing provider or within the authorizing provider's specialty.  See \"Patient Info\" tab in inbasket, or \"Choose Columns\" in Meds & Orders section of the refill encounter.              Passed - Medication is active on med list        Passed - Patient is age 18 or older          "

## 2019-06-07 ENCOUNTER — OFFICE VISIT (OUTPATIENT)
Dept: FAMILY MEDICINE | Facility: CLINIC | Age: 29
End: 2019-06-07
Payer: COMMERCIAL

## 2019-06-07 VITALS
OXYGEN SATURATION: 95 % | HEART RATE: 92 BPM | WEIGHT: 315 LBS | SYSTOLIC BLOOD PRESSURE: 119 MMHG | TEMPERATURE: 97.8 F | DIASTOLIC BLOOD PRESSURE: 71 MMHG | BODY MASS INDEX: 54.3 KG/M2

## 2019-06-07 DIAGNOSIS — F51.04 PSYCHOPHYSIOLOGICAL INSOMNIA: ICD-10-CM

## 2019-06-07 DIAGNOSIS — F32.2 SEVERE MAJOR DEPRESSION (H): ICD-10-CM

## 2019-06-07 DIAGNOSIS — I10 HYPERTENSION GOAL BP (BLOOD PRESSURE) < 140/90: ICD-10-CM

## 2019-06-07 DIAGNOSIS — R94.31 PROLONGED Q-T INTERVAL ON ECG: Primary | ICD-10-CM

## 2019-06-07 DIAGNOSIS — E66.01 MORBID OBESITY (H): ICD-10-CM

## 2019-06-07 DIAGNOSIS — F40.01 PANIC DISORDER WITH AGORAPHOBIA: ICD-10-CM

## 2019-06-07 PROCEDURE — 93000 ELECTROCARDIOGRAM COMPLETE: CPT | Performed by: NURSE PRACTITIONER

## 2019-06-07 PROCEDURE — 99214 OFFICE O/P EST MOD 30 MIN: CPT | Performed by: NURSE PRACTITIONER

## 2019-06-07 RX ORDER — SERTRALINE HYDROCHLORIDE 100 MG/1
100 TABLET, FILM COATED ORAL DAILY
Qty: 90 TABLET | Refills: 1 | Status: SHIPPED | OUTPATIENT
Start: 2019-06-07 | End: 2019-10-10

## 2019-06-07 RX ORDER — ZOLPIDEM TARTRATE 5 MG/1
5 TABLET ORAL
Qty: 30 TABLET | Refills: 2 | Status: SHIPPED | OUTPATIENT
Start: 2019-06-07 | End: 2019-10-10

## 2019-06-07 RX ORDER — CLONAZEPAM 1 MG/1
1 TABLET ORAL 2 TIMES DAILY PRN
Qty: 60 TABLET | Refills: 2 | Status: SHIPPED | OUTPATIENT
Start: 2019-06-07 | End: 2019-09-04

## 2019-06-07 RX ORDER — LISINOPRIL AND HYDROCHLOROTHIAZIDE 20; 25 MG/1; MG/1
1 TABLET ORAL DAILY
Qty: 90 TABLET | Refills: 1 | Status: SHIPPED | OUTPATIENT
Start: 2019-06-07 | End: 2019-10-10

## 2019-06-07 ASSESSMENT — ANXIETY QUESTIONNAIRES
3. WORRYING TOO MUCH ABOUT DIFFERENT THINGS: SEVERAL DAYS
5. BEING SO RESTLESS THAT IT IS HARD TO SIT STILL: SEVERAL DAYS
GAD7 TOTAL SCORE: 6
2. NOT BEING ABLE TO STOP OR CONTROL WORRYING: SEVERAL DAYS
IF YOU CHECKED OFF ANY PROBLEMS ON THIS QUESTIONNAIRE, HOW DIFFICULT HAVE THESE PROBLEMS MADE IT FOR YOU TO DO YOUR WORK, TAKE CARE OF THINGS AT HOME, OR GET ALONG WITH OTHER PEOPLE: SOMEWHAT DIFFICULT
6. BECOMING EASILY ANNOYED OR IRRITABLE: NOT AT ALL
1. FEELING NERVOUS, ANXIOUS, OR ON EDGE: SEVERAL DAYS
7. FEELING AFRAID AS IF SOMETHING AWFUL MIGHT HAPPEN: SEVERAL DAYS

## 2019-06-07 ASSESSMENT — PAIN SCALES - GENERAL: PAINLEVEL: NO PAIN (0)

## 2019-06-07 ASSESSMENT — PATIENT HEALTH QUESTIONNAIRE - PHQ9
SUM OF ALL RESPONSES TO PHQ QUESTIONS 1-9: 7
5. POOR APPETITE OR OVEREATING: SEVERAL DAYS

## 2019-06-07 NOTE — PATIENT INSTRUCTIONS
I refilled your Klonopin. 60 tablets is to last you a month so do not take more than 1 tablet twice daily    I increased your sertraline (Zoloft) to 100 mg    I stopped your omeprazole (Prilosec). Try to avoid eating big, greasy meals to avoid chest pain/heart burn. Do not lay down within 2 hours after eating. If you develop heart burn you could take TUMS or Zantac which you can buy over the counter.     Follow up with me in 3 months

## 2019-06-07 NOTE — TELEPHONE ENCOUNTER
Routing refill request to provider for review/approval because:  DX is not on problem list.  Beverly Romeo RN [facility] Triage.

## 2019-06-07 NOTE — PROGRESS NOTES
"Subjective     Jeovanny Salcedo is a 28 year old male who presents to clinic today for the following health issues:    HPI     At our last visit Paxil was discontinued d/t prolonged QTc interval seen on ECG  Started on sertraline approximately 4 weeks ago  He had been experiencing \"night sweats\" since starting Paxil and lisinopril-hydrochlorothiazide   This symptom has resolved since stopping Paxil  Did not like Buspar. Caused dizziness, \"heavy head\", \"off balance\"    He was previously on Klonopin for anxiety which was ordered by previous provider and was helpful for him    Started on PPI for suspect GERD after ED visit for chest pain  He ran out of medication a few days ago  No more chest pain or heart burn since being seen in ED          Patient Active Problem List   Diagnosis     overweight HCC     Hypertension goal BP (blood pressure) < 140/90     Severe major depression (H)     Panic disorder with agoraphobia     History reviewed. No pertinent surgical history.    Social History     Tobacco Use     Smoking status: Never Smoker     Smokeless tobacco: Never Used   Substance Use Topics     Alcohol use: Yes     Comment: Occ.     Family History   Problem Relation Age of Onset     Hypertension Maternal Grandfather      Diabetes Maternal Grandfather      Lung Cancer Maternal Grandfather      Anxiety Disorder Brother      Cervical Cancer Paternal Grandmother              Reviewed and updated as needed this visit by Provider         Review of Systems   ROS COMP: Constitutional, HEENT, cardiovascular, pulmonary, gi and gu systems are negative, except as otherwise noted.      Objective    /71 (BP Location: Right arm, Patient Position: Chair, Cuff Size: Adult Large)   Pulse 92   Temp 97.8  F (36.6  C) (Oral)   Wt (!) 153.8 kg (339 lb)   SpO2 95%   BMI 54.30 kg/m    Body mass index is 54.3 kg/m .  Physical Exam   GENERAL: healthy, alert and no distress  RESP: lungs clear to auscultation - no rales, rhonchi or " wheezes  CV: regular rate and rhythm, normal S1 S2, no S3 or S4, no murmur, click or rub, no peripheral edema and peripheral pulses strong  PSYCH: flat affect, appears anxious, appears to be reliable historian, makes    Diagnostic Test Results:  Labs reviewed in Epic  Results for orders placed or performed in visit on 05/20/19   Hepatitis B surface antigen   Result Value Ref Range    Hep B Surface Agn Nonreactive NR^Nonreactive   Hepatitis B Surface Antibody   Result Value Ref Range    Hepatitis B Surface Antibody 13.66 (H) <8.00 m[IU]/mL   Hepatitis B core antibody   Result Value Ref Range    Hepatitis B Core Rosie Nonreactive NR^Nonreactive   **Hepatitis C Screen Reflex to RNA FUTURE anytime   Result Value Ref Range    Hepatitis C Antibody Nonreactive NR^Nonreactive   Lipid panel reflex to direct LDL Fasting   Result Value Ref Range    Cholesterol 221 (H) <200 mg/dL    Triglycerides 186 (H) <150 mg/dL    HDL Cholesterol 38 (L) >39 mg/dL    LDL Cholesterol Calculated 146 (H) <100 mg/dL    Non HDL Cholesterol 183 (H) <130 mg/dL   Comprehensive metabolic panel (BMP + Alb, Alk Phos, ALT, AST, Total. Bili, TP)   Result Value Ref Range    Sodium 139 133 - 144 mmol/L    Potassium 3.9 3.4 - 5.3 mmol/L    Chloride 107 94 - 109 mmol/L    Carbon Dioxide 23 20 - 32 mmol/L    Anion Gap 9 3 - 14 mmol/L    Glucose 98 70 - 99 mg/dL    Urea Nitrogen 15 7 - 30 mg/dL    Creatinine 1.06 0.66 - 1.25 mg/dL    GFR Estimate >90 >60 mL/min/[1.73_m2]    GFR Estimate If Black >90 >60 mL/min/[1.73_m2]    Calcium 9.2 8.5 - 10.1 mg/dL    Bilirubin Total 0.3 0.2 - 1.3 mg/dL    Albumin 3.9 3.4 - 5.0 g/dL    Protein Total 8.3 6.8 - 8.8 g/dL    Alkaline Phosphatase 129 40 - 150 U/L    ALT 73 (H) 0 - 70 U/L    AST 40 0 - 45 U/L           Assessment & Plan       ICD-10-CM    1. Prolonged Q-T interval on ECG R94.31 EKG 12-lead complete w/read - Clinics   2. Severe major depression (H) F32.2 clonazePAM (KLONOPIN) 1 MG tablet     sertraline (ZOLOFT) 100  "MG tablet   3. Panic disorder with agoraphobia F40.01 clonazePAM (KLONOPIN) 1 MG tablet   4. overweight HCC E66.01    5. Hypertension goal BP (blood pressure) < 140/90 I10 lisinopril-hydrochlorothiazide (PRINZIDE/ZESTORETIC) 20-25 MG tablet   6. Psychophysiological insomnia F51.04 zolpidem (AMBIEN) 5 MG tablet        BMI:   Estimated body mass index is 54.3 kg/m  as calculated from the following:    Height as of 4/18/19: 1.683 m (5' 6.25\").    Weight as of this encounter: 153.8 kg (339 lb).   Weight management plan: Discussed healthy diet and exercise guidelines    Will continue with Klonopin. He is aware of potential side effects. Will need CSA at next visit  Recommend further increase in serotonin specific reuptake inhibitor  Prolonged QTc interval has resolved. Like from paroxetine  Will stop prilosec as he is no longer taking      Patient Instructions   I refilled your Klonopin. 60 tablets is to last you a month so do not take more than 1 tablet twice daily    I increased your sertraline (Zoloft) to 100 mg    I stopped your omeprazole (Prilosec). Try to avoid eating big, greasy meals to avoid chest pain/heart burn. Do not lay down within 2 hours after eating. If you develop heart burn you could take TUMS or Zantac which you can buy over the counter.     Follow up with me in 3 months        SLOAN Mcconnell [facility]        "

## 2019-06-08 ASSESSMENT — ANXIETY QUESTIONNAIRES: GAD7 TOTAL SCORE: 6

## 2019-06-12 DIAGNOSIS — I10 HYPERTENSION GOAL BP (BLOOD PRESSURE) < 140/90: ICD-10-CM

## 2019-06-12 RX ORDER — LISINOPRIL AND HYDROCHLOROTHIAZIDE 20; 25 MG/1; MG/1
TABLET ORAL
Qty: 30 TABLET | Refills: 0 | OUTPATIENT
Start: 2019-06-12

## 2019-06-12 NOTE — TELEPHONE ENCOUNTER
"Requested Prescriptions   Pending Prescriptions Disp Refills     lisinopril-hydrochlorothiazide (PRINZIDE/ZESTORETIC) 20-25 MG tablet [Pharmacy Med Name: LISINOPRIL-HCTZ 20/25MG TABLETS] 30 tablet 0     Sig: TAKE 1 TABLET BY MOUTH EVERY DAY   Last Written Prescription Date:  6/7/19  Last Fill Quantity: 90,  # refills: 1   Last office visit: 6/7/2019 with prescribing provider:     Future Office Visit:        Diuretics (Including Combos) Protocol Passed - 6/12/2019  3:34 AM        Passed - Blood pressure under 140/90 in past 12 months     BP Readings from Last 3 Encounters:   06/07/19 119/71   05/02/19 116/78   04/18/19 (!) 171/98                 Passed - Recent (12 mo) or future (30 days) visit within the authorizing provider's specialty     Patient had office visit in the last 12 months or has a visit in the next 30 days with authorizing provider or within the authorizing provider's specialty.  See \"Patient Info\" tab in inbasket, or \"Choose Columns\" in Meds & Orders section of the refill encounter.              Passed - Medication is active on med list        Passed - Patient is age 18 or older        Passed - Normal serum creatinine on file in past 12 months     Recent Labs   Lab Test 05/20/19  1322   CR 1.06              Passed - Normal serum potassium on file in past 12 months     Recent Labs   Lab Test 05/20/19  1322   POTASSIUM 3.9                    Passed - Normal serum sodium on file in past 12 months     Recent Labs   Lab Test 05/20/19  1322                   "

## 2019-07-01 DIAGNOSIS — F32.2 SEVERE MAJOR DEPRESSION (H): ICD-10-CM

## 2019-07-02 ENCOUNTER — TELEPHONE (OUTPATIENT)
Dept: FAMILY MEDICINE | Facility: CLINIC | Age: 29
End: 2019-07-02

## 2019-07-02 DIAGNOSIS — K21.9 GASTROESOPHAGEAL REFLUX DISEASE, ESOPHAGITIS PRESENCE NOT SPECIFIED: ICD-10-CM

## 2019-07-02 NOTE — TELEPHONE ENCOUNTER
Please clarify. He stopped this medication prior to our last visit. Is he having new symptoms? What symptoms and how often? Is he taking anything for them?

## 2019-07-02 NOTE — TELEPHONE ENCOUNTER
Tried calling patient but received VM and his mailbox is not setup.  Will need to try back.  Catrachita Martino RN

## 2019-07-02 NOTE — TELEPHONE ENCOUNTER
"Requested Prescriptions   Pending Prescriptions Disp Refills     omeprazole (PRILOSEC) 20 MG DR capsule [Pharmacy Med Name: OMEPRAZOLE 20MG CAPSULES] 30 capsule 0     Sig: TAKE 1 CAPSULE BY MOUTH EVERY DAY         Last Written Prescription Date:  na  Last Fill Quantity: na,   # refills: na  Last Office Visit: 6/7/19  Future Office visit:       Routing refill request to provider for review/approval because:  Drug not active on patient's medication list      PPI Protocol Failed - 7/2/2019  5:07 AM        Failed - Medication is active on med list        Passed - Not on Clopidogrel (unless Pantoprazole ordered)        Passed - No diagnosis of osteoporosis on record        Passed - Recent (12 mo) or future (30 days) visit within the authorizing provider's specialty     Patient had office visit in the last 12 months or has a visit in the next 30 days with authorizing provider or within the authorizing provider's specialty.  See \"Patient Info\" tab in inbasket, or \"Choose Columns\" in Meds & Orders section of the refill encounter.              Passed - Patient is age 18 or older          "

## 2019-07-02 NOTE — TELEPHONE ENCOUNTER
"Requested Prescriptions   Pending Prescriptions Disp Refills     sertraline (ZOLOFT) 50 MG tablet [Pharmacy Med Name: SERTRALINE 50MG TABLETS] 30 tablet 0     Sig: TAKE 1 TABLET BY MOUTH EVERY DAY   Last Written Prescription Date:  6/7/19  Last Fill Quantity: 90,  # refills: 1   Last office visit: 6/7/2019 with prescribing provider:     Future Office Visit:        SSRIs Protocol Failed - 7/1/2019  7:11 PM        Failed - PHQ-9 score less than 5 in past 6 months     Please review last PHQ-9 score.           Passed - Medication is active on med list        Passed - Patient is age 18 or older        Passed - Recent (6 mo) or future (30 days) visit within the authorizing provider's specialty     Patient had office visit in the last 6 months or has a visit in the next 30 days with authorizing provider or within the authorizing provider's specialty.  See \"Patient Info\" tab in inbasket, or \"Choose Columns\" in Meds & Orders section of the refill encounter.              "

## 2019-07-03 NOTE — TELEPHONE ENCOUNTER
Patient states this must have been an automated request.  He did not request a refill and is not taking per below.  Pharmacy informed.    Catrachita Martino RN

## 2019-07-07 DIAGNOSIS — F51.04 PSYCHOPHYSIOLOGICAL INSOMNIA: ICD-10-CM

## 2019-07-08 RX ORDER — ZOLPIDEM TARTRATE 5 MG/1
TABLET ORAL
Qty: 30 TABLET | Refills: 0 | OUTPATIENT
Start: 2019-07-08

## 2019-07-08 NOTE — TELEPHONE ENCOUNTER
Requested Prescriptions   Pending Prescriptions Disp Refills     zolpidem (AMBIEN) 5 MG tablet [Pharmacy Med Name: ZOLPIDEM 5MG TABLETS] 30 tablet 0     Sig: TAKE 1 TABLET BY MOUTH EVERY NIGHT AT BEDTIME AS NEEDED FOR SLEEP       There is no refill protocol information for this order         Last Written Prescription Date:  6/7/19  Last Fill Quantity: 30,   # refills: 2  Last Office Visit: 6/7/19  Future Office visit:       Routing refill request to provider for review/approval because:  Drug not on the [facility], [facility] or [facility] refill protocol or controlled substance

## 2019-09-04 DIAGNOSIS — F40.01 PANIC DISORDER WITH AGORAPHOBIA: ICD-10-CM

## 2019-09-04 DIAGNOSIS — F32.2 SEVERE MAJOR DEPRESSION (H): ICD-10-CM

## 2019-09-04 NOTE — TELEPHONE ENCOUNTER
Requested Prescriptions   Pending Prescriptions Disp Refills     clonazePAM (KLONOPIN) 1 MG tablet [Pharmacy Med Name: CLONAZEPAM 1MG TABLETS] 60 tablet 0     Sig: TAKE 1 TABLET BY MOUTH TWICE DAILY AS NEEDED FOR ANXIETY       There is no refill protocol information for this order         Last Written Prescription Date:  6/7/19  Last Fill Quantity: 60,   # refills: 2  Last Office Visit: 6/7/19  Future Office visit:       Routing refill request to provider for review/approval because:  Drug not on the [facility], P or [facility] refill protocol or controlled substance

## 2019-09-05 RX ORDER — CLONAZEPAM 1 MG/1
TABLET ORAL
Qty: 60 TABLET | Refills: 0 | Status: SHIPPED | OUTPATIENT
Start: 2019-09-05 | End: 2019-10-10

## 2019-09-05 NOTE — TELEPHONE ENCOUNTER
Medication refilled  Please remind patient he is due to follow up with me. Last seen early June and advised to f/u in 3 months

## 2019-09-05 NOTE — TELEPHONE ENCOUNTER
Script faxed to [facility]. TC contacted patient and scheduled follow up visit on 09/10/19.

## 2019-09-10 ENCOUNTER — TELEPHONE (OUTPATIENT)
Dept: FAMILY MEDICINE | Facility: CLINIC | Age: 29
End: 2019-09-10

## 2019-09-10 NOTE — TELEPHONE ENCOUNTER
Prior Authorization Retail Medication Request    Medication/Dose: Paroxetine 20 MG, 3 tablets by mouth daily  ICD code (if different than what is on RX):  F32.2 and F40.01  Previously Tried and Failed:    Rationale:  Severe major depression and panic disorder with agoraphobia    Insurance Name:  Yung SINGH  Insurance ID:  [id number]      Pharmacy Information (if different than what is on RX)  Name:  [facility]  Phone:  [phone]

## 2019-09-16 NOTE — TELEPHONE ENCOUNTER
Prior Authorization Approval    Authorization Effective Date: 9/15/2019  Authorization Expiration Date: 9/15/2020  Medication: Paroxetine 20 MG, 3 tablets by mouth daily  Approved Dose/Quantity:    Reference #: [id number]   Insurance Company: [employer] - Phone [phone] Fax [phone]  Expected CoPay:       CoPay Card Available:      Foundation Assistance Needed:    Which Pharmacy is filling the prescription (Not needed for infusion/clinic administered): [id number] - [place], [place] - [address] [place] [address] RD [address] AT [facility] [address] [facility]  Pharmacy Notified: Yes  Patient Notified: Yes **Instructed pharmacy to notify patient when script is ready to /ship.**

## 2019-10-10 ENCOUNTER — OFFICE VISIT (OUTPATIENT)
Dept: FAMILY MEDICINE | Facility: CLINIC | Age: 29
End: 2019-10-10
Payer: COMMERCIAL

## 2019-10-10 VITALS
WEIGHT: 315 LBS | DIASTOLIC BLOOD PRESSURE: 64 MMHG | HEART RATE: 82 BPM | BODY MASS INDEX: 54.62 KG/M2 | OXYGEN SATURATION: 100 % | TEMPERATURE: 98.4 F | SYSTOLIC BLOOD PRESSURE: 111 MMHG

## 2019-10-10 DIAGNOSIS — F40.01 PANIC DISORDER WITH AGORAPHOBIA: ICD-10-CM

## 2019-10-10 DIAGNOSIS — I10 HYPERTENSION GOAL BP (BLOOD PRESSURE) < 140/90: ICD-10-CM

## 2019-10-10 DIAGNOSIS — F32.2 SEVERE MAJOR DEPRESSION (H): ICD-10-CM

## 2019-10-10 DIAGNOSIS — Z23 NEED FOR PROPHYLACTIC VACCINATION AND INOCULATION AGAINST INFLUENZA: Primary | ICD-10-CM

## 2019-10-10 DIAGNOSIS — F51.04 PSYCHOPHYSIOLOGICAL INSOMNIA: ICD-10-CM

## 2019-10-10 PROCEDURE — 99214 OFFICE O/P EST MOD 30 MIN: CPT | Mod: 25 | Performed by: NURSE PRACTITIONER

## 2019-10-10 PROCEDURE — 90471 IMMUNIZATION ADMIN: CPT | Performed by: NURSE PRACTITIONER

## 2019-10-10 PROCEDURE — 90686 IIV4 VACC NO PRSV 0.5 ML IM: CPT | Performed by: NURSE PRACTITIONER

## 2019-10-10 RX ORDER — LISINOPRIL AND HYDROCHLOROTHIAZIDE 20; 25 MG/1; MG/1
1 TABLET ORAL DAILY
Qty: 90 TABLET | Refills: 1 | Status: SHIPPED | OUTPATIENT
Start: 2019-10-10 | End: 2020-01-13

## 2019-10-10 RX ORDER — ZOLPIDEM TARTRATE 5 MG/1
5 TABLET ORAL
Qty: 30 TABLET | Refills: 3 | Status: SHIPPED | OUTPATIENT
Start: 2019-10-10

## 2019-10-10 RX ORDER — CLONAZEPAM 1 MG/1
TABLET ORAL
Qty: 60 TABLET | Refills: 0 | Status: SHIPPED | OUTPATIENT
Start: 2019-10-10 | End: 2019-11-10

## 2019-10-10 RX ORDER — SERTRALINE HYDROCHLORIDE 100 MG/1
100 TABLET, FILM COATED ORAL DAILY
Qty: 90 TABLET | Refills: 1 | Status: SHIPPED | OUTPATIENT
Start: 2019-10-10

## 2019-10-10 RX ORDER — LANOLIN ALCOHOL/MO/W.PET/CERES
3 CREAM (GRAM) TOPICAL
Qty: 90 TABLET | Refills: 3 | Status: SHIPPED | OUTPATIENT
Start: 2019-10-10

## 2019-10-10 ASSESSMENT — PAIN SCALES - GENERAL: PAINLEVEL: NO PAIN (0)

## 2019-10-10 NOTE — PATIENT INSTRUCTIONS
Try taking Ambien 30 minutes before bedtime  Avoid electronics 60 minutes before bed - calm music is ok  No caffeine after 2 pm  Try also taking 1 tablet Melatonin before bed    I would like for you to see a psychiatrist. You should receive a call to schedule  You will need to start seeing a psychiatrist for ongoing medication refills   Continue seeing your therapist    I am transferring to a different [facility]. You will need to find a new provider to establish care with

## 2019-10-10 NOTE — PROGRESS NOTES
Subjective     Jeovanny Salcedo is a 29 year old male who presents to clinic today for the following health issues:    HPI   Medication Followup of see medication list    Taking Medication as prescribed: yes    Side Effects:  None    Medication Helping Symptoms:  Zolpidem is working any more for him       He is requesting refills on his medication  He does not think the Ambien is as helpful anymore  He does not work. He lives with his aunt  He goes to bed around midnight  He denies watching tv or using other electronic devices in the evenings  He states he listens to music before bed  He takes Ambien approximately 15 minutes before bed and it takes him approximately 30 minutes to fall asleep  He sleeps approximately 8 hours/night  He drinks several cans of pop throughout the day, including in the evening    He is concerned he has ADD  He states he is forgetful  For example, the other day he was doing the dishes and then got distract and forgot to finish them  He can not detail more examples  He has never been evaluated for ADD before  He is seeing a therapist, Bonnie Newberry at [facility]  He states she has been given him exercises to work on improving attention        Patient Active Problem List   Diagnosis     overweight HCC     Hypertension goal BP (blood pressure) < 140/90     Severe major depression (H)     Panic disorder with agoraphobia     History reviewed. No pertinent surgical history.    Social History     Tobacco Use     Smoking status: Never Smoker     Smokeless tobacco: Never Used   Substance Use Topics     Alcohol use: Yes     Comment: Occ.     Family History   Problem Relation Age of Onset     Hypertension Maternal Grandfather      Diabetes Maternal Grandfather      Lung Cancer Maternal Grandfather      Anxiety Disorder Brother      Cervical Cancer Paternal Grandmother          Current Outpatient Medications   Medication Sig Dispense Refill     clonazePAM (KLONOPIN) 1 MG tablet TAKE 1 TABLET BY  MOUTH TWICE DAILY AS NEEDED FOR ANXIETY 60 tablet 0     lisinopril-hydrochlorothiazide (PRINZIDE/ZESTORETIC) 20-25 MG tablet Take 1 tablet by mouth daily 90 tablet 1     melatonin 3 MG tablet Take 1 tablet (3 mg) by mouth nightly as needed for sleep 90 tablet 3     sertraline (ZOLOFT) 100 MG tablet Take 1 tablet (100 mg) by mouth daily 90 tablet 1     zolpidem (AMBIEN) 5 MG tablet Take 1 tablet (5 mg) by mouth nightly as needed for sleep 30 tablet 3     BP Readings from Last 3 Encounters:   10/10/19 111/64   06/07/19 119/71   05/02/19 116/78    Wt Readings from Last 3 Encounters:   10/10/19 (!) 154.7 kg (341 lb)   06/07/19 (!) 153.8 kg (339 lb)   05/02/19 (!) 153.3 kg (338 lb)                 Reviewed and updated as needed this visit by Provider         Review of Systems   ROS COMP: Constitutional, HEENT, cardiovascular, pulmonary, gi and gu systems are negative, except as otherwise noted.      Objective    /64 (BP Location: Right arm, Patient Position: Chair, Cuff Size: Adult Large)   Pulse 82   Temp 98.4  F (36.9  C) (Oral)   Wt (!) 154.7 kg (341 lb)   SpO2 100%   BMI 54.62 kg/m    Body mass index is 54.62 kg/m .  Physical Exam   GENERAL: healthy, alert and no distress  RESP: lungs clear to auscultation - no rales, rhonchi or wheezes  CV: regular rate and rhythm, normal S1 S2, no S3 or S4, no murmur, click or rub, no peripheral edema and peripheral pulses strong  PSYCH: flat affect, speech is slow, poor historian    Diagnostic Test Results:  Labs reviewed in Epic        Assessment & Plan       ICD-10-CM    1. Need for prophylactic vaccination and inoculation against influenza Z23 INFLUENZA VACCINE IM > 6 MONTHS VALENT IIV4 [79637]     Vaccine Administration, Initial [20886]   2. Severe major depression (H) F32.2 clonazePAM (KLONOPIN) 1 MG tablet     MENTAL HEALTH REFERRAL  - Adult; Psychiatry and Medication Management; Psychiatry; Other: [facility] [phone]; We will contact  you to schedule the appointment or please call with any questions     sertraline (ZOLOFT) 100 MG tablet   3. Panic disorder with agoraphobia F40.01 clonazePAM (KLONOPIN) 1 MG tablet     MENTAL HEALTH REFERRAL  - Adult; Psychiatry and Medication Management; Psychiatry; Other: [facility] [phone]; We will contact you to schedule the appointment or please call with any questions   4. Psychophysiological insomnia F51.04 zolpidem (AMBIEN) 5 MG tablet     MENTAL HEALTH REFERRAL  - Adult; Psychiatry and Medication Management; Psychiatry; Other: [facility] [phone]; We will contact you to schedule the appointment or please call with any questions     melatonin 3 MG tablet   5. Hypertension goal BP (blood pressure) < 140/90 I10 lisinopril-hydrochlorothiazide (PRINZIDE/ZESTORETIC) 20-25 MG tablet       Continue with same medications  I do not recommend increasing Ambien. He discussed sleep hygiene strategies and will add Melatonin  Recommend seeing psychiatrist, and establishing with a new provider    Patient Instructions   Try taking Ambien 30 minutes before bedtime  Avoid electronics 60 minutes before bed - calm music is ok  No caffeine after 2 pm  Try also taking 1 tablet Melatonin before bed    I would like for you to see a psychiatrist. You should receive a call to schedule  You will need to start seeing a psychiatrist for ongoing medication refills   Continue seeing your therapist    I am transferring to a different [facility]. You will need to find a new provider to establish care with         SLOAN Mcconnell [facility]

## 2019-11-10 DIAGNOSIS — F40.01 PANIC DISORDER WITH AGORAPHOBIA: ICD-10-CM

## 2019-11-10 DIAGNOSIS — F32.2 SEVERE MAJOR DEPRESSION (H): ICD-10-CM

## 2019-11-11 RX ORDER — CLONAZEPAM 1 MG/1
TABLET ORAL
Qty: 60 TABLET | Refills: 0 | Status: SHIPPED | OUTPATIENT
Start: 2019-11-11 | End: 2019-12-10

## 2019-11-11 NOTE — TELEPHONE ENCOUNTER
Requested Prescriptions   Pending Prescriptions Disp Refills     clonazePAM (KLONOPIN) 1 MG tablet [Pharmacy Med Name: CLONAZEPAM 1MG TABLETS] 60 tablet 0     Sig: TAKE 1 TABLET BY MOUTH TWICE DAILY AS NEEDED FOR ANXIETY       There is no refill protocol information for this order         Last Written Prescription Date:  10/10/19  Last Fill Quantity: 60,   # refills: 0  Last Office Visit: 10/10/19  Future Office visit:       Routing refill request to provider for review/approval because:  Drug not on the G, P or [facility] refill protocol or controlled substance

## 2019-12-10 DIAGNOSIS — F32.2 SEVERE MAJOR DEPRESSION (H): ICD-10-CM

## 2019-12-10 DIAGNOSIS — F40.01 PANIC DISORDER WITH AGORAPHOBIA: ICD-10-CM

## 2019-12-10 RX ORDER — CLONAZEPAM 1 MG/1
TABLET ORAL
Qty: 60 TABLET | Refills: 0 | Status: SHIPPED | OUTPATIENT
Start: 2019-12-10 | End: 2020-01-13

## 2019-12-10 NOTE — TELEPHONE ENCOUNTER
TC spoke with patient and communicated message below. Patient has seen psychiatry a couple of times already. He is going to reach out to them and see if they can take over prescribing medication. If not, he knows he needs to establish care with a new provider.

## 2019-12-10 NOTE — TELEPHONE ENCOUNTER
I refilled medication for this month but needs to establish care with new provider for ongoing care and refills. Additionally, I had referred to psychiatry in October. Has he scheduled? Can call [phone] to schedule

## 2020-01-08 DIAGNOSIS — F32.2 SEVERE MAJOR DEPRESSION (H): ICD-10-CM

## 2020-01-08 DIAGNOSIS — F40.01 PANIC DISORDER WITH AGORAPHOBIA: ICD-10-CM

## 2020-01-09 RX ORDER — CLONAZEPAM 1 MG/1
TABLET ORAL
Qty: 60 TABLET | OUTPATIENT
Start: 2020-01-09

## 2020-01-09 NOTE — TELEPHONE ENCOUNTER
Refill denied  Please reach out to patient. See last refill request 12/10/19. Patient advised to establish with new primary care provider. He stated he was seeing psychiatrist and would ask them to take over prescribing. Is psychiatry refilling?

## 2020-01-09 NOTE — TELEPHONE ENCOUNTER
Requested Prescriptions   Pending Prescriptions Disp Refills     clonazePAM (KLONOPIN) 1 MG tablet [Pharmacy Med Name: CLONAZEPAM 1MG TABLETS] 60 tablet      Sig: TAKE 1 TABLET BY MOUTH TWICE DAILY AS NEEDED FOR ANXIETY       There is no refill protocol information for this order         Last Written Prescription Date:  12/10/19  Last Fill Quantity: 60,   # refills: 0  Last Office Visit: 10/10/19  Future Office visit:    Next 5 appointments (look out 90 days)    Jan 13, 2020 11:40 AM CST  SHORT with Andrei Mclean [facility] [address] [place] [id number] [phone]           Routing refill request to provider for review/approval because:  Drug not on the [facility], [facility] or [facility] refill protocol or controlled substance

## 2020-01-09 NOTE — TELEPHONE ENCOUNTER
KAYLA spoke with patient. He has an appointment to establish care with Dr Mclean on 01/10. He is aware that refill has been denied at this time.

## 2020-01-13 ENCOUNTER — OFFICE VISIT (OUTPATIENT)
Dept: FAMILY MEDICINE | Facility: CLINIC | Age: 30
End: 2020-01-13
Payer: COMMERCIAL

## 2020-01-13 VITALS
OXYGEN SATURATION: 98 % | HEART RATE: 101 BPM | SYSTOLIC BLOOD PRESSURE: 117 MMHG | TEMPERATURE: 97.6 F | DIASTOLIC BLOOD PRESSURE: 72 MMHG | HEIGHT: 66 IN | BODY MASS INDEX: 50.62 KG/M2 | WEIGHT: 315 LBS

## 2020-01-13 DIAGNOSIS — F32.2 SEVERE MAJOR DEPRESSION (H): ICD-10-CM

## 2020-01-13 DIAGNOSIS — I10 HYPERTENSION GOAL BP (BLOOD PRESSURE) < 140/90: ICD-10-CM

## 2020-01-13 DIAGNOSIS — F40.01 PANIC DISORDER WITH AGORAPHOBIA: ICD-10-CM

## 2020-01-13 PROCEDURE — 99214 OFFICE O/P EST MOD 30 MIN: CPT | Performed by: FAMILY MEDICINE

## 2020-01-13 RX ORDER — CLONAZEPAM 1 MG/1
TABLET ORAL
Qty: 60 TABLET | Refills: 0 | Status: SHIPPED | OUTPATIENT
Start: 2020-01-13

## 2020-01-13 RX ORDER — GABAPENTIN 100 MG/1
CAPSULE ORAL
COMMUNITY
Start: 2020-01-03

## 2020-01-13 RX ORDER — LISINOPRIL AND HYDROCHLOROTHIAZIDE 20; 25 MG/1; MG/1
1 TABLET ORAL DAILY
Qty: 90 TABLET | Refills: 1 | Status: SHIPPED | OUTPATIENT
Start: 2020-01-13 | End: 2020-07-09

## 2020-01-13 RX ORDER — MIRTAZAPINE 7.5 MG/1
TABLET, FILM COATED ORAL
COMMUNITY
Start: 2019-12-21

## 2020-01-13 RX ORDER — BUPROPION HYDROCHLORIDE 300 MG/1
TABLET ORAL
COMMUNITY
Start: 2020-01-07

## 2020-01-13 ASSESSMENT — ANXIETY QUESTIONNAIRES
6. BECOMING EASILY ANNOYED OR IRRITABLE: SEVERAL DAYS
1. FEELING NERVOUS, ANXIOUS, OR ON EDGE: NEARLY EVERY DAY
IF YOU CHECKED OFF ANY PROBLEMS ON THIS QUESTIONNAIRE, HOW DIFFICULT HAVE THESE PROBLEMS MADE IT FOR YOU TO DO YOUR WORK, TAKE CARE OF THINGS AT HOME, OR GET ALONG WITH OTHER PEOPLE: VERY DIFFICULT
3. WORRYING TOO MUCH ABOUT DIFFERENT THINGS: NEARLY EVERY DAY
7. FEELING AFRAID AS IF SOMETHING AWFUL MIGHT HAPPEN: NEARLY EVERY DAY
GAD7 TOTAL SCORE: 17
2. NOT BEING ABLE TO STOP OR CONTROL WORRYING: NEARLY EVERY DAY
5. BEING SO RESTLESS THAT IT IS HARD TO SIT STILL: SEVERAL DAYS

## 2020-01-13 ASSESSMENT — PATIENT HEALTH QUESTIONNAIRE - PHQ9
5. POOR APPETITE OR OVEREATING: NEARLY EVERY DAY
SUM OF ALL RESPONSES TO PHQ QUESTIONS 1-9: 21

## 2020-01-13 ASSESSMENT — MIFFLIN-ST. JEOR: SCORE: 2486.65

## 2020-01-13 ASSESSMENT — PAIN SCALES - GENERAL: PAINLEVEL: NO PAIN (0)

## 2020-01-13 NOTE — PROGRESS NOTES
Subjective     Jeovanny Salcedo is a 29 year old male who presents to clinic today for the following health issues:    HPI :  Patient with history of hypertension, morbid obesity.  History of depression, generalized anxiety disorder.  He does follow-up with a psychiatrist.  He said he sees Leatha Solitario  out of [place].  She does prescribe him Zoloft, Remeron, Neurontin and Wellbutrin.  Patient been receiving Klonopin and Ambien from his previous primary care physician.    Patient is not sure why his psychiatric does not fill his Klonopin and Ambien have.    He denies any alcohol abuse or drugs abuse.  Denies depression or suicidal thoughts or ideation.    He reports he does see a psychiatrist and a therapist once every month.  He has been out of his Klonopin for the past 3 days.  History of hypertension, morbid obesity.  Blood pressure has been well controlled.    Depression and Anxiety Follow-Up    How are you doing with your depression since your last visit? Improved- staying active    How are you doing with your anxiety since your last visit?  Improved     Are you having other symptoms that might be associated with depression or anxiety? Yes:  trouble sleeping    Have you had a significant life event? No     Do you have any concerns with your use of alcohol or other drugs? No    Social History     Tobacco Use     Smoking status: Never Smoker     Smokeless tobacco: Never Used   Substance Use Topics     Alcohol use: Yes     Comment: Occ.     Drug use: Never     PHQ 5/2/2019 6/7/2019 1/13/2020   PHQ-9 Total Score 15 7 21   Q9: Thoughts of better off dead/self-harm past 2 weeks Several days Not at all Several days   F/U: Thoughts of suicide or self-harm - - No   F/U: Safety concerns - - No     GIBSON-7 SCORE 5/2/2019 6/7/2019 1/13/2020   Total Score 13 6 17     Last PHQ-9 1/13/2020   1.  Little interest or pleasure in doing things 2   2.  Feeling down, depressed, or hopeless 2   3.  Trouble falling or staying asleep, or  sleeping too much 3   4.  Feeling tired or having little energy 3   5.  Poor appetite or overeating 3   6.  Feeling bad about yourself 2   7.  Trouble concentrating 2   8.  Moving slowly or restless 3   Q9: Thoughts of better off dead/self-harm past 2 weeks 1   PHQ-9 Total Score 21   Difficulty at work, home, or with people Very difficult   In the past two weeks have you had thoughts of suicide or self harm? No   Do you have concerns about your personal safety or the safety of others? No     GIBSON-7  1/13/2020   1. Feeling nervous, anxious, or on edge 3   2. Not being able to stop or control worrying 3   3. Worrying too much about different things 3   4. Trouble relaxing 3   5. Being so restless that it is hard to sit still 1   6. Becoming easily annoyed or irritable 1   7. Feeling afraid, as if something awful might happen 3   GIBSON-7 Total Score 17   If you checked any problems, how difficult have they made it for you to do your work, take care of things at home, or get along with other people? Very difficult       Suicide Assessment Five-step Evaluation and Treatment (SAFE-T)      How many servings of fruits and vegetables do you eat daily?  0-1    On average, how many sweetened beverages do you drink each day (Examples: soda, juice, sweet tea, etc.  Do NOT count diet or artificially sweetened beverages)?   3    How many days per week do you miss taking your medication? 0        Patient Active Problem List   Diagnosis     overweight HCC     Hypertension goal BP (blood pressure) < 140/90     Severe major depression (H)     Panic disorder with agoraphobia     History reviewed. No pertinent surgical history.    Social History     Tobacco Use     Smoking status: Never Smoker     Smokeless tobacco: Never Used   Substance Use Topics     Alcohol use: Yes     Comment: Occ.     Family History   Problem Relation Age of Onset     Hypertension Maternal Grandfather      Diabetes Maternal Grandfather      Lung Cancer Maternal  "Grandfather      Anxiety Disorder Brother      Cervical Cancer Paternal Grandmother          Current Outpatient Medications   Medication Sig Dispense Refill     buPROPion (WELLBUTRIN XL) 300 MG 24 hr tablet TK 1 T PO D       clonazePAM (KLONOPIN) 1 MG tablet One bid as needed ( one fill only ) future fills need to come from his psychiatry. 60 tablet 0     gabapentin (NEURONTIN) 100 MG capsule TK 3 CAPSULES PO TID FOR 30 DAYS       lisinopril-hydrochlorothiazide (PRINZIDE/ZESTORETIC) 20-25 MG tablet Take 1 tablet by mouth daily 90 tablet 1     melatonin 3 MG tablet Take 1 tablet (3 mg) by mouth nightly as needed for sleep 90 tablet 3     mirtazapine (REMERON) 7.5 MG tablet TK 1 T PO D FOR 30 DAYS       sertraline (ZOLOFT) 100 MG tablet Take 1 tablet (100 mg) by mouth daily 90 tablet 1     zolpidem (AMBIEN) 5 MG tablet Take 1 tablet (5 mg) by mouth nightly as needed for sleep 30 tablet 3     No Known Allergies    Reviewed and updated as needed this visit by Provider         Review of Systems   ROS COMP: Constitutional, HEENT, cardiovascular, pulmonary, gi and gu systems are negative, except as otherwise noted.      Objective    /72 (BP Location: Left arm, Patient Position: Chair, Cuff Size: Adult Large)   Pulse 101   Temp 97.6  F (36.4  C) (Oral)   Ht 1.685 m (5' 6.34\")   Wt (!) 157.9 kg (348 lb)   SpO2 98%   BMI 55.60 kg/m    Body mass index is 55.6 kg/m .  Physical Exam   GENERAL: healthy, alert and no distress  MS: no gross musculoskeletal defects noted, no edema  NEURO: Normal strength and tone, mentation intact and speech normal  PSYCH: mentation appears normal, affect normal/bright    Diagnostic Test Results:  Labs reviewed in Epic  none         Assessment & Plan       ICD-10-CM    1. Severe major depression (H) F32.2 clonazePAM (KLONOPIN) 1 MG tablet   2. Panic disorder with agoraphobia F40.01 clonazePAM (KLONOPIN) 1 MG tablet   3. Hypertension goal BP (blood pressure) < 140/90 I10 " "lisinopril-hydrochlorothiazide (PRINZIDE/ZESTORETIC) 20-25 MG tablet     Patient has been established with a psychiatry.  Reviewing the [place]  will page.  It did indicate patient be receiving his gabapentin through his psychiatry.    I did discuss with the patient I do not feel comfortable filling his clonazepam with Ambien.  Since he is already been seen a psychiatrist would be more appropriate to have these medications filled from his psychiatry.    one month supply was given to the Klonopin.  I discussed with him no more further refill on his Klonopin and Ambien.  These medication needs to come from psychiatry.  He was advised to continue with his therapist as well.    Blood pressure is been controlled he is to continue with his current medication.    Morbid obesity discussed diet exercise weight loss.    Patient to follow-up in 3 to 6 months for complete physical exam.  BMI:   Estimated body mass index is 55.6 kg/m  as calculated from the following:    Height as of this encounter: 1.685 m (5' 6.34\").    Weight as of this encounter: 157.9 kg (348 lb).   Weight management plan: Discussed healthy diet and exercise guidelines    [place] Prescription Monitoring Program, ( ) referenced. No indication of misuse, or abuse.        There are no Patient Instructions on file for this visit.    Return in about 3 months (around 4/13/2020) for Physical Exam.    Andrei Mclean MD  [facility]    "

## 2020-01-14 ASSESSMENT — ANXIETY QUESTIONNAIRES: GAD7 TOTAL SCORE: 17

## 2020-01-27 ENCOUNTER — OFFICE VISIT (OUTPATIENT)
Dept: SLEEP MEDICINE | Facility: CLINIC | Age: 30
End: 2020-01-27
Attending: NURSE PRACTITIONER
Payer: COMMERCIAL

## 2020-01-27 VITALS
RESPIRATION RATE: 16 BRPM | SYSTOLIC BLOOD PRESSURE: 132 MMHG | HEIGHT: 66 IN | DIASTOLIC BLOOD PRESSURE: 55 MMHG | OXYGEN SATURATION: 95 % | WEIGHT: 315 LBS | HEART RATE: 92 BPM | BODY MASS INDEX: 50.62 KG/M2

## 2020-01-27 DIAGNOSIS — I10 HYPERTENSION GOAL BP (BLOOD PRESSURE) < 140/90: ICD-10-CM

## 2020-01-27 DIAGNOSIS — G47.30 OBSERVED SLEEP APNEA: Primary | ICD-10-CM

## 2020-01-27 DIAGNOSIS — R06.83 SNORING: ICD-10-CM

## 2020-01-27 PROCEDURE — 99244 OFF/OP CNSLTJ NEW/EST MOD 40: CPT | Performed by: PHYSICIAN ASSISTANT

## 2020-01-27 RX ORDER — GABAPENTIN 400 MG/1
1200 CAPSULE ORAL AT BEDTIME
COMMUNITY
Start: 2020-01-25

## 2020-01-27 ASSESSMENT — MIFFLIN-ST. JEOR: SCORE: 2500.26

## 2020-01-27 NOTE — PATIENT INSTRUCTIONS
"MY TREATMENT INFORMATION FOR SLEEP APNEA-  Jeovanny Salcedo    DOCTOR : Bennett Ezra Goltz, PA-C  SLEEP CENTER : [facility]     MY CONTACT NUMBER: [phone]    Am I having a sleep study at a sleep center?  Make sure you have an appointment for the study before you leave!    Am I having a home sleep study?  Watch this video:  [email or web address]  Please verify your insurance coverage with your insurance carrier    Frequently asked questions:  1. What is Obstructive Sleep Apnea (GLENNY)? GLENNY is the most common type of sleep apnea. Apnea means, \"without breath.\"  Apnea is most often caused by narrowing or collapse of the upper airway as muscles relax during sleep.   Almost everyone has occasional apneas. Most people with sleep apnea have had brief interruptions at night frequently for many years.  The severity of sleep apnea is related to how frequent and severe the events are.   2. What are the consequences of GLENNY? Symptoms include: feeling sleepy during the day, snoring loudly, gasping or stopping of breathing, trouble sleeping, and occasionally morning headaches or heartburn at night.  Sleepiness can be serious and even increase the risk of falling asleep while driving. Other health consequences may include development of high blood pressure and other cardiovascular disease in persons who are susceptible. Untreated GLENNY  can contribute to heart disease, stroke and diabetes.   3. What are the treatment options? In most situations, sleep apnea is a lifelong disease that must be managed with daily therapy. Medications are not effective for sleep apnea and surgery is generally not considered until other therapies have been tried. Your treatment is your choice . Continuous Positive Airway (CPAP) works right away and is the therapy that is effective in nearly everyone. An oral device to hold your jaw forward is usually the next most reliable option. Other options " include postioning devices (to keep you off your back), weight loss, and surgery including a tongue pacing device. There is more detail about some of these options below.    Important tips for using CPAP and similar devices   Know your equipment:  CPAP is continuous positive airway pressure that prevents obstructive sleep apnea by keeping the throat from collapsing while you are sleeping. In most cases, the device is  smart  and can slowly self-adjusts if your throat collapses and keeps a record every day of how well you are treated-this information is available to you and your care team.  BPAP is bilevel positive airway pressure that keeps your throat open and also assists each breath with a pressure boost to maintain adequate breathing.  Special kinds of BPAP are used in patients who have inadequate breathing from lung or heart disease. In most cases, the device is  smart  and can slowly self-adjusts to assist breathing. Like CPAP, the device keeps a record of how well you are treated.  Your mask is your connection to the device. You get to choose what feels most comfortable and the staff will help to make sure if fits. Here: are some examples of the different masks that are available:       Key points to remember on your journey with sleep apnea:  1. Sleep study.  PAP devices often need to be adjusted during a sleep study to show that they are effective and adjusted right.  2. Good tips to remember: Try wearing just the mask during a quiet time during the day so your body adapts to wearing it. A humidifier is recommended for comfort in most cases to prevent drying of your nose and throat. Allergy medication from your provider may help you if you are having nasal congestion.  3. Getting settled-in. It takes more than one night for most of us to get used to wearing a mask. Try wearing just the mask during a quiet time during the day so your body adapts to wearing it. A humidifier is recommended for comfort in most  cases. Our team will work with you carefully on the first day and will be in contact within 4 days and again at 2 and 4 weeks for advice and remote device adjustments. Your therapy is evaluated by the device each day.   4. Use it every night. The more you are able to sleep naturally for 7-8 hours, the more likely you will have good sleep and to prevent health risks or symptoms from sleep apnea. Even if you use it 4 hours it helps. Occasionally all of us are unable to use a medical therapy, in sleep apnea, it is not dangerous to miss one night.   5. Communicate. Call our skilled team on the number provided on the first day if your visit for problems that make it difficult to wear the device. Over 2 out of 3 patients can learn to wear the device long-term with help from our team. Remember to call our team or your sleep providers if you are unable to wear the device as we may have other solutions for those who cannot adapt to mask CPAP therapy. It is recommended that you sleep your sleep provider within the first 3 months and yearly after that if you are not having problems.   6. Use it for your health. We encourage use of CPAP masks during daytime quiet periods to allow your face and brain to adapt to the sensation of CPAP so that it will be a more natural sensation to awaken to at night or during naps. This can be very useful during the first few weeks or months of adapting to CPAP though it does not help medically to wear CPAP during wakefulness and  should not be used as a strategy just to meet guidelines.  7. Take care of your equipment. Make sure you clean your mask and tubing using directions every day and that your filter and mask are replaced as recommended or if they are not working.     BESIDES CPAP, WHAT OTHER THERAPIES ARE THERE?    Positioning Device  Positioning devices are generally used when sleep apnea is mild and only occurs on your back.This example shows a pillow that straps around the waist. It  may be appropriate for those whose sleep study shows milder sleep apnea that occurs primarily when lying flat on one's back. Preliminary studies have shown benefit but effectiveness at home may need to be verified by a home sleep test. These devices are generally not covered by medical insurance.  Examples of devices that maintain sleeping on the back to prevent snoring and mild sleep apnea.    Belt type body positioner  Http://[employer].com/    Electronic reminder  Http://nightshifttherapy.com/  Http://www.[employer]Grabbit.au/      Oral Appliance  What is oral appliance therapy?  An oral appliance device fits on your teeth at night like a retainer used after having braces. The device is made by a specialized dentist and requires several visits over 1-2 months before a manufactured device is made to fit your teeth and is adjusted to prevent your sleep apnea. Once an oral device is working properly, snoring should be improved. A home sleep test may be recommended at that time if to determine whether the sleep apnea is adequately treated.       Some things to remember:  -Oral devices are often, but not always, covered by your medical insurance. Be sure to check with your insurance provider.   -If you are referred for oral therapy, you will be given a list of specialized dentists to consider or you may choose to visit the Web site of the American Academy of Dental Sleep Medicine  -Oral devices are less likely to work if you have severe sleep apnea or are extremely overweight.     More detailed information  An oral appliance is a small acrylic device that fits over the upper and lower teeth  (similar to a retainer or a mouth guard). This device slightly moves jaw forward, which moves the base of the tongue forward, opens the airway, improves breathing for effective treat snoring and obstructive sleep apnea in perhaps 7 out of 10 people .  The best working devices are custom-made by a dental device  after a mold is  made of the teeth 1, 2, 3.  When is an oral appliance indicated?  Oral appliance therapy is recommended as a first-line treatment for patients with primary snoring, mild sleep apnea, and for patients with moderate sleep apnea who prefer appliance therapy to use of CPAP4, 5. Severity of sleep apnea is determined by sleep testing and is based on the number of respiratory events per hour of sleep.   How successful is oral appliance therapy?  The success rate of oral appliance therapy in patients with mild sleep apnea is 75-80% while in patients with moderate sleep apnea it is 50-70%. The chance of success in patients with severe sleep apnea is 40-50%. The research also shows that oral appliances have a beneficial effect on the cardiovascular health of GLENNY patients at the same magnitude as CPAP therapy7.  Oral appliances should be a second-line treatment in cases of severe sleep apnea, but if not completely successful then a combination therapy utilizing CPAP plus oral appliance therapy may be effective. Oral appliances tend to be effective in a broad range of patients although studies show that the patients who have the highest success are females, younger patients, those with milder disease, and less severe obesity. 3, 6.   Finding a dentist that practices dental sleep medicine  Specific training is available through the American Academy of Dental Sleep Medicine for dentists interested in working in the field of sleep. To find a dentist who is educated in the field of sleep and the use of oral appliances, near you, visit the Web site of the American Academy of Dental Sleep Medicine.    References  1. Dimas, et al. Objectively measured vs self-reported compliance during oral appliance therapy for sleep-disordered breathing. Chest 2013; 144(5): 0543-1703.  2. Rica et al. Objective measurement of compliance during oral appliance therapy for sleep-disordered breathing. Thorax 2013; 68(1): 91-96.  3. Shira  et al. Mandibular advancement devices in 620 men and women with GLENNY and snoring: tolerability and predictors of treatment success. Chest 2004; 125: 3434-6562.  4. Tereza et al. Oral appliances for snoring and GLENNY: a review. Sleep 2006; 29: 244-262.  5. Darling et al. Oral appliance treatment for GLENNY: an update. J Clin Sleep Med 2014; 10(2): 215-227.  6. Valencia et al. Predictors of OSAH treatment outcome. J Dent Res 2007; 86: 5912-2951.    Weight Loss:    Weight loss is a long-term strategy that may improve sleep apnea in some patients.    Weight management is a personal decision and the decision should be based on your interest and the potential benefits.  If you are interested in exploring weight loss strategies, the following discussion covers the impact on weight loss on sleep apnea and the approaches that may be successful.    Being overweight does not necessarily mean you will have health consequences.  Those who have BMI over 35 or over 27 with existing medical conditions carries greater risk.   Weight loss decreases severity of sleep apnea in most people with obesity. For those with mild obesity who have developed snoring with weight gain, even 15-30 pound weight loss can improve and occasionally eliminate sleep apnea.  Structured and life-long dietary and health habits are necessary to lose weight and keep healthier weight levels.     Though there may be significant health benefits from weight loss, long-term weight loss is very difficult to achieve- studies show success with dietary management in less than 10% of people. In addition, substantial weight loss may require years of dietary control and may be difficult if patients have severe obesity. In these cases, surgical management may be considered.  Finally, older individuals who have tolerated obesity without health complications may be less likely to benefit from weight loss strategies.      Your BMI is Body mass index is 56.08 kg/m .  Weight  management is a personal decision.  If you are interested in exploring weight loss strategies, the following discussion covers the approaches that may be successful. Body mass index (BMI) is one way to tell whether you are at a healthy weight, overweight, or obese. It measures your weight in relation to your height.  A BMI of 18.5 to 24.9 is in the healthy range. A person with a BMI of 25 to 29.9 is considered overweight, and someone with a BMI of 30 or greater is considered obese. More than two-thirds of American adults are considered overweight or obese.  Being overweight or obese increases the risk for further weight gain. Excess weight may lead to heart disease and diabetes.  Creating and following plans for healthy eating and physical activity may help you improve your health.  Weight control is part of healthy lifestyle and includes exercise, emotional health, and healthy eating habits. Careful eating habits lifelong are the mainstay of weight control. Though there are significant health benefits from weight loss, long-term weight loss with diet alone may be very difficult to achieve- studies show long-term success with dietary management in less than 10% of people. Attaining a healthy weight may be especially difficult to achieve in those with severe obesity. In some cases, medications, devices and surgical management might be considered.  What can you do?  If you are overweight or obese and are interested in methods for weight loss, you should discuss this with your provider.     Consider reducing daily calorie intake by 500 calories.     Keep a food journal.     Avoiding skipping meals, consider cutting portions instead.    Diet combined with exercise helps maintain muscle while optimizing fat loss. Strength training is particularly important for building and maintaining muscle mass. Exercise helps reduce stress, increase energy, and improves fitness. Increasing exercise without diet control, however, may  not burn enough calories to loose weight.       Start walking three days a week 10-20 minutes at a time    Work towards walking thirty minutes five days a week     Eventually, increase the speed of your walking for 1-2 minutes at time    In addition, we recommend that you review healthy lifestyles and methods for weight loss available through the National Institutes of Health patient information sites:  http://win.niddk.nih.gov/publications/index.htm    And look into health and wellness programs that may be available through your health insurance provider, employer, local community center, or [employer].    Weight management plan: Patient was referred to their PCP to discuss a diet and exercise plan.    Surgery:  Surgery for obstructive sleep apnea is considered generally only when other therapies fail to work. Surgery may be discussed with you if you are having a difficult time tolerating CPAP and or when there is an abnormal structure that requires surgical correction.  Nose and throat surgeries often enlarge the airway to prevent collapse.  Most of these surgeries create pain for 1-2 weeks and up to half of the most common surgeries are not effective throughout life.  You should carefully discuss the benefits and drawbacks to surgery with your sleep provider and surgeon to determine if it is the best solution for you.   More information  Surgery for GLENNY is directed at areas that are responsible for narrowing or complete obstruction of the airway during sleep.  There are a wide range of procedures available to enlarge and/or stabilize the airway to prevent blockage of breathing in the three major areas where it can occur: the palate, tongue, and nasal regions.  Successful surgical treatment depends on the accurate identification of the factors responsible for obstructive sleep apnea in each person.  A personalized approach is required because there is no single treatment that works well for everyone.  Because of  anatomic variation, consultation with an examination by a sleep surgeon is a critical first step in determining what surgical options are best for each patient.  In some cases, examination during sedation may be recommended in order to guide the selection of procedures.  Patients will be counseled about risks and benefits as well as the typical recovery course after surgery. Surgery is typically not a cure for a person s GLENNY.  However, surgery will often significantly improve one s GLENNY severity (termed  success rate ).  Even in the absence of a cure, surgery will decrease the cardiovascular risk associated with OSA7; improve overall quality of life8 (sleepiness, functionality, sleep quality, etc).  Palate Procedures:  Patients with GLENNY often have narrowing of their airway in the region of their tonsils and uvula.  The goals of palate procedures are to widen the airway in this region as well as to help the tissues resist collapse.  Modern palate procedure techniques focus on tissue conservation and soft tissue rearrangement, rather than tissue removal.  Often the uvula is preserved in this procedure. Residual sleep apnea is common in patient after pharyngoplasty with an average reduction in sleep apnea events of 33%2.    Tongue Procedures:  ExamWhile patients are awake, the muscles that surround the throat are active and keep this region open for breathing. These muscles relax during sleep, allowing the tongue and other structures to collapse and block breathing.  There are several different tongue procedures available.  Selection of a tongue base procedure depends on characteristics seen on physical exam.  Generally, procedures are aimed at removing bulky tissues in this area or preventing the back of the tongue from falling back during sleep.  Success rates for tongue surgery range from 50-62%3.  Hypoglossal Nerve Stimulation:  Hypoglossal nerve stimulation has recently received approval from the [place] Food  and Drug Administration for the treatment of obstructive sleep apnea.  This is based on research showing that the system was safe and effective in treating sleep apnea6.  Results showed that the median AHI score decreased 68%, from 29.3 to 9.0. This therapy uses an implant system that senses breathing patterns and delivers mild stimulation to airway muscles, which keeps the airway open during sleep.  The system consists of three fully implanted components: a small generator (similar in size to a pacemaker), a breathing sensor, and a stimulation lead.  Using a small handheld remote, a patient turns the therapy on before bed and off upon awakening.    Candidates for this device must be greater than 22 years of age, have moderate to severe GLENNY (AHI between 20-65), BMI less than 32, have tried CPAP/oral appliance without success, and have appropriate upper airway anatomy (determined by a sleep endoscopy performed by Dr. Zayas).  Hypoglossal Nerve Stimulation Pathway:    The sleep surgeon s office will work with the patient through the insurance prior-authorization process (including communications and appeals).    Nasal Procedures:  Nasal obstruction can interfere with nasal breathing during the day and night.  Studies have shown that relief of nasal obstruction can improve the ability of some patients to tolerate positive airway pressure therapy for obstructive sleep apnea1.  Treatment options include medications such as nasal saline, topical corticosteroid and antihistamine sprays, and oral medications such as antihistamines or decongestants. Non-surgical treatments can include external nasal dilators for selected patients. If these are not successful by themselves, surgery can improve the nasal airway either alone or in combination with these other options.  Combination Procedures:  Combination of surgical procedures and other treatments may be recommended, particularly if patients have more than one area of narrowing  or persistent positional disease.  The success rate of combination surgery ranges from 66-80%2,3.    References  1. Marina NOVOA. The Role of the Nose in Snoring and Obstructive Sleep Apnoea: An Update.  Eur Arch Otorhinolaryngol. 2011; 268: 1365-73.  2.  Mike SM; Page JA; Tai JR; Pallanch JF; Malka MB; Yesenia SG; Javid WALLACE. Surgical modifications of the upper airway for obstructive sleep apnea in adults: a systematic review and meta-analysis. SLEEP 2010;33(10):9588-8368. Lc SEAMAN. Hypopharyngeal surgery in obstructive sleep apnea: an evidence-based medicine review.  Arch Otolaryngol Head Neck Surg. 2006 Feb;132(2):206-13.  3. Alexey YH1, Kalin Y, Joaquín THUY. The efficacy of anatomically based multilevel surgery for obstructive sleep apnea. Otolaryngol Head Neck Surg. 2003 Oct;129(4):327-35.  4. Lc SEAMAN, Goldberg A. Hypopharyngeal Surgery in Obstructive Sleep Apnea: An Evidence-Based Medicine Review. Arch Otolaryngol Head Neck Surg. 2006 Feb;132(2):206-13.  5. Екатерина PJ et al. Upper-Airway Stimulation for Obstructive Sleep Apnea.  N Engl J Med. 2014 Jan 9;370(2):139-49.  6. Betsy Y et al. Increased Incidence of Cardiovascular Disease in Middle-aged Men with Obstructive Sleep Apnea. Am J Respir Crit Care Med; 2002 166: 159-165  7. Ruddy EM et al. Studying Life Effects and Effectiveness of Palatopharyngoplasty (SLEEP) study: Subjective Outcomes of Isolated Uvulopalatopharyngoplasty. Otolaryngol Head Neck Surg. 2011; 144: 623-631.

## 2020-01-27 NOTE — PROGRESS NOTES
Sleep Consultation:    Date on this visit: 1/27/2020    Jeovanny Salcedo is sent by Padmini Feng for a sleep consultation regarding anea.    Primary Physician: Padmini Feng     Jeovanny Salcedo reports he has sleep apnea for 2 years. His medical history is significant for HTN, depression, panic disorder, morbid obesity.     Jeovanny goes to sleep at 12:00 AM during the week. He wakes up at 10:00 AM with an alarm. He feels tired and it is hard to get up. He falls asleep in 120 minutes.  Jeovanny has difficulty falling asleep.  He has prescriptions for zopidem 5 mg,  mirtazapine 7.5 mg.  He also takes 3 mg melatonin. He recently was increased to 1200 mg gabapentin for sleep as well. He wakes up 2-4 times a night for 30-60 minutes before falling back to sleep.  Jeovanny wakes up to panic.  On weekends, his sleep schedule is the same.  Patient gets an average of 6 hours of sleep per night.     Patient does use electronics in bed (listens to music) and does not watch TV in bed and read in bed. He has a lot of anxiety. His mind keeps him up.    Jeovanny is not currently working. He lives with his brother and aunt.  He used to live in [place] until about 1.5 years ago. He was there for about 10 years. He grew up in [place].     Jeovanny does snore every night and snoring is loud. Patient does not have a regular bed partner. He is observed when he dozes at family functions. He does have witnessed apneas. Patient sleeps on his back, side and stomach. He has occasional snort arousals, morning dry mouth and morning headaches (couple times per week), denies no morning confusion. Jeovanny has occasional bruxism (in the day, no evidence of that at night) and sleep paralysis (2-3 per month) and denies any sleep walking, sleep talking, dream enactment, cataplexy and hypnogogic/hypnopompic hallucinations.  He has muscle tension in his legs and shoulders that he feels day and night. It can interfere with sleep.     Jeovanny has  difficulty breathing through his nose, reflux at night, heartburn, depression and anxiety, denies claustrophobia.  He takes clonazepam 1 mg in the morning and when in public.     Jeovanny has gained 5-10 pounds in the last year.  Patient describes themself as a night person.  He would prefer to go to sleep at 2:00 AM and wake up at 12:00 AM.  Patient's [place] Sleepiness score 19/24 consistent with severe daytime sleepiness. He feels tired all day.     Jeovanny naps 3 times per week for 120 minutes, feels unrefreshed after naps. He takes frequent inadvertant naps.  He does not drive.  Patient was counseled on the importance of driving while alert, to pull over if drowsy, or nap before getting into the vehicle if sleepy.  He uses 4 sodas/day. He may have coffee in the morning as well. Last caffeine intake is often in the evening.    Allergies:    No Known Allergies    Medications:    Current Outpatient Medications   Medication Sig Dispense Refill     buPROPion (WELLBUTRIN XL) 300 MG 24 hr tablet TK 1 T PO D       clonazePAM (KLONOPIN) 1 MG tablet One bid as needed ( one fill only ) future fills need to come from his psychiatry. 60 tablet 0     gabapentin (NEURONTIN) 100 MG capsule TK 3 CAPSULES PO TID FOR 30 DAYS       lisinopril-hydrochlorothiazide (PRINZIDE/ZESTORETIC) 20-25 MG tablet Take 1 tablet by mouth daily 90 tablet 1     melatonin 3 MG tablet Take 1 tablet (3 mg) by mouth nightly as needed for sleep 90 tablet 3     mirtazapine (REMERON) 7.5 MG tablet TK 1 T PO D FOR 30 DAYS       sertraline (ZOLOFT) 100 MG tablet Take 1 tablet (100 mg) by mouth daily 90 tablet 1     zolpidem (AMBIEN) 5 MG tablet Take 1 tablet (5 mg) by mouth nightly as needed for sleep 30 tablet 3       Problem List:  Patient Active Problem List    Diagnosis Date Noted     overweight HCC 04/18/2019     Priority: Medium     Hypertension goal BP (blood pressure) < 140/90 04/18/2019     Priority: Medium     Severe major depression (H) 04/18/2019      Priority: Medium     Panic disorder with agoraphobia 04/18/2019     Priority: Medium        Past Medical/Surgical History:  Past Medical History:   Diagnosis Date     Depressive disorder     anx anxiety     Hypertension      History reviewed. No pertinent surgical history.    Social History:  Social History     Socioeconomic History     Marital status: Single     Spouse name: Not on file     Number of children: Not on file     Years of education: Not on file     Highest education level: Not on file   Occupational History     Not on file   Social Needs     Financial resource strain: Not on file     Food insecurity:     Worry: Not on file     Inability: Not on file     Transportation needs:     Medical: Not on file     Non-medical: Not on file   Tobacco Use     Smoking status: Never Smoker     Smokeless tobacco: Never Used   Substance and Sexual Activity     Alcohol use: Yes     Comment: Occ.     Drug use: Never     Sexual activity: Not Currently   Lifestyle     Physical activity:     Days per week: Not on file     Minutes per session: Not on file     Stress: Not on file   Relationships     Social connections:     Talks on phone: Not on file     Gets together: Not on file     Attends Shinto service: Not on file     Active member of club or organization: Not on file     Attends meetings of clubs or organizations: Not on file     Relationship status: Not on file     Intimate partner violence:     Fear of current or ex partner: Not on file     Emotionally abused: Not on file     Physically abused: Not on file     Forced sexual activity: Not on file   Other Topics Concern     Not on file   Social History Narrative     Not on file       Family History:  Family History   Problem Relation Age of Onset     Hypertension Maternal Grandfather      Diabetes Maternal Grandfather      Lung Cancer Maternal Grandfather      Anxiety Disorder Brother      Cervical Cancer Paternal Grandmother        Review of Systems:  A complete  "review of systems reviewed by me is negative with the exeption of what has been mentioned in the history of present illness.  CONSTITUTIONAL: NEGATIVE for weight loss, fever, chills, sweats, drug allergies.  CONSTITUTIONAL:  POSITIVE for  weight gain and night sweats  EYES: POSITIVE for changes in vision, blind spots, double vision (when dozing).  ENT: POSITIVE for sore throat  CARDIAC: NEGATIVE for swollen legs or swollen feet.  CARDIAC:  POSITIVE for  fast heart beats, fluttering in chest, chest pain (more shoulders), chest pressure, breathlessness when lying flat and HTN  NEUROLOGIC:  POSITIVE for  headaches and weakness or numbness in the arms or legs  DERMATOLOGIC: NEGATIVE for rashes, new moles or change in mole(s)  PULMONARY: NEGATIVE productive cough, coughing up blood, whistling when breathing.    PULMONARY:  POSITIVE for  SOB at rest, SOB with activity, dry cough and wheezing   GASTROINTESTINAL: NEGATIVE for nausea or vomitting, loose or watery stools, fat or grease in stools, constipation, abdominal pain, bowel movements black in color or blood noted.  GENITOURINARY: NEGATIVE for pain during urination, blood in urine, urinating more frequently than usual, irregular menstrual periods.  MUSCULOSKELETAL:  POSITIVE for  muscle pain, bone or joint pain and swollen joints  ENDOCRINE: NEGATIVE for diabetes.  ENDOCRINE:  POSITIVE for  increased thirst and increased urination  LYMPHATIC: NEGATIVE for swollen lymph nodes, lumps or bumps in the breasts or nipple discharge.  MENTAL HEALTH: POSITIVE for anxiety and depression    Physical Examination:  Vitals: /55   Pulse 92   Resp 16   Ht 1.685 m (5' 6.34\")   Wt (!) 159.2 kg (351 lb)   SpO2 95%   BMI 56.08 kg/m      Neck Cir (cm): 57 cm    [place] Total Score 1/27/2020   Total score - Crosby 19       CAROLYN Total Score: 28 (01/27/20 1107)    GENERAL APPEARANCE: healthy, alert, no distress and cooperative  EYES: Eyes grossly normal to inspection, PERRL, " conjunctivae and sclerae normal and lids and lashes normal  HENT: nose and mouth without ulcers or lesions, oropharynx crowded and tongue base enlarged  NECK: no adenopathy, no asymmetry, masses, or scars, thyroid normal to palpation and trachea midline and normal to palpation  RESP: lungs clear to auscultation - no rales, rhonchi or wheezes  CV: regular rates and rhythm, normal S1 S2, no S3 or S4, no murmur, click or rub and no irregular beats  LYMPHATICS: no cervical adenopathy  MS: extremities normal- no gross deformities noted  NEURO: Normal strength and tone, mentation intact, speech normal and cranial nerves 2-12 intact  Mallampati Class: IV.  Tonsillar Stage: 1  hidden by pillars.    Impression/Plan:    (G47.30) Observed sleep apnea  (primary encounter diagnosis), (R06.83) Snoring, (Z68.43) BMI 50.0-59.9, adult (H), (I10) Hypertension goal BP (blood pressure) < 140/90  Comment: Jeovanny presents for evaluation of GLENNY.  He does not have a regular bed partner, but family members have observed snoring and pauses in breathing whenever he dozes off during the day.  He is sleepy, dozing inadvertently frequently.  His ESS is 19/24.  He has HTN.  BMI is 56.  Neck circumference is 57 cm.  Gender male.  This gives a stop bang score of 7.  His only negative risk factors being less than 50 years old (29).  There is risk for hypoventilation given his BMI of 56.  That being said, his O2 saturation is normal today at 95%, and CO2 has been low normal on metabolic panels.  He does wake with headaches a couple of times per week.  He has difficulty sleeping due to at least in part to anxiety.  He also appears to have a delayed sleep preference.  Plan: Comprehensive Sleep Study        Split night PSG with CPAP/BiPAP titration if indicated. TCM to evaluate for hypoventilation. He was advised that he can take his sleep aids as usual.  We will talk about behavioral recommendations to improve his sleep as needed after his sleep  study.        Literature provided regarding sleep apnea.      He will follow up with me in approximately two weeks after his sleep study has been competed to review the results and discuss plan of care.       Polysomnography reviewed.  Obstructive sleep apnea reviewed.  Complications of untreated sleep apnea were reviewed.  45 minutes was spent during this visit, over 50% in counseling and coordination of care.   Bennett Goltz, PA-C    CC: Padmini Feng

## 2020-02-10 DIAGNOSIS — F32.2 SEVERE MAJOR DEPRESSION (H): ICD-10-CM

## 2020-02-10 DIAGNOSIS — F51.04 PSYCHOPHYSIOLOGICAL INSOMNIA: ICD-10-CM

## 2020-02-10 DIAGNOSIS — F40.01 PANIC DISORDER WITH AGORAPHOBIA: ICD-10-CM

## 2020-02-11 RX ORDER — ZOLPIDEM TARTRATE 5 MG/1
TABLET ORAL
Qty: 30 TABLET | OUTPATIENT
Start: 2020-02-11

## 2020-02-11 RX ORDER — CLONAZEPAM 1 MG/1
TABLET ORAL
Qty: 60 TABLET | Refills: 0 | OUTPATIENT
Start: 2020-02-11

## 2020-02-11 NOTE — TELEPHONE ENCOUNTER
zolpidem (AMBIEN) 5 MG tablet      Last Written Prescription Date:  10/10/19  Last Fill Quantity: 30,   # refills: 3  Last Office Visit: [place]  Future Office visit:       Routing refill request to provider for review/approval because:  Drug not on the [facility], [facility] or [facility] refill protocol or controlled substance

## 2020-02-11 NOTE — TELEPHONE ENCOUNTER
clonazePAM (KLONOPIN) 1 MG tablet      Last Written Prescription Date:  1/13/2020  Last Fill Quantity: 60,   # refills: 0  Last Office Visit: Caridad  Future Office visit:       Routing refill request to provider for review/approval because:  Drug not on the [facility], [facility] or [facility] refill protocol or controlled substance

## 2020-02-11 NOTE — TELEPHONE ENCOUNTER
TC notified [facility] that refills should come from patient's psychiatrist per 01/13 office visit with Dr Mclean.

## 2020-02-23 ENCOUNTER — THERAPY VISIT (OUTPATIENT)
Dept: SLEEP MEDICINE | Facility: CLINIC | Age: 30
End: 2020-02-23
Payer: COMMERCIAL

## 2020-02-23 DIAGNOSIS — G47.33 OSA (OBSTRUCTIVE SLEEP APNEA): Primary | ICD-10-CM

## 2020-02-23 DIAGNOSIS — R06.83 SNORING: ICD-10-CM

## 2020-02-23 DIAGNOSIS — G47.30 OBSERVED SLEEP APNEA: ICD-10-CM

## 2020-02-23 DIAGNOSIS — I10 HYPERTENSION GOAL BP (BLOOD PRESSURE) < 140/90: ICD-10-CM

## 2020-02-23 PROCEDURE — 95811 POLYSOM 6/>YRS CPAP 4/> PARM: CPT | Performed by: PSYCHIATRY & NEUROLOGY

## 2020-02-24 LAB — SLPCOMP: NORMAL

## 2020-02-24 NOTE — PATIENT INSTRUCTIONS
[facility]    1. Your sleep study will be reviewed by a sleep physician within the next few days.     2. Please follow up in the sleep clinic as scheduled, or, make an appointment with your sleep provider to be seen within two weeks to discuss the results of the sleep study.    3. If you have any questions or problems with your treatment plan, please contact your sleep clinic provider at [phone] to further manage your condition.    4. Please review your attached medication list, and, at your follow-up appointment advise your sleep clinic provider about any changes.    5. Go to [email or web address] for more information about your sleep problems.    Louise Camarillo, RPSGT  February 24, 2020

## 2020-02-24 NOTE — TELEPHONE ENCOUNTER
Requested Prescriptions   Pending Prescriptions Disp Refills     clonazePAM (KLONOPIN) 1 MG tablet [Pharmacy Med Name: CLONAZEPAM 1MG TABLETS] 60 tablet 0     Sig: TAKE 1 TABLET BY MOUTH TWICE DAILY AS NEEDED FOR ANXIETY       There is no refill protocol information for this order         Last Written Prescription Date:  11/11/19  Last Fill Quantity: 60,   # refills: 0  Last Office Visit: 10/10/19  Future Office visit:       Routing refill request to provider for review/approval because:  Drug not on the [facility], [facility] or [facility] refill protocol or controlled substance          Unknown if ever smoked

## 2020-02-24 NOTE — PROCEDURES
" SLEEP STUDY INTERPRETATION  SPLIT NIGHT STUDY      Patient: YAYA WOLFE  YOB: 1990  Study Date: 2/23/2020  MRN: [id number]  Referring Provider: SLOAN Feng CNP, Brooke Elizabeth  Ordering Provider: Goltz, PA-C, Bennett    Indications for Polysomnography: The patient is a 29 y old Male who is 5' 6\" and weighs 351.0 lbs. His BMI is 57.1, [place] sleepiness scale 19.0 and neck circumference is 57.0 cm. Relevant medical history includes morbid obesity, snoring, witnessed apneas. A diagnostic polysomnogram was performed to evaluate for sleep apnea/hypoventilation/hypoxemia. After 141.5 minutes of sleep time the patient exhibited sufficient respiratory events qualifying him for a CPAP trial which was then initiated.    Polysomnogram Data: A full night polysomnogram recorded the standard physiologic parameters including EEG, EOG, EMG, ECG, nasal and oral airflow. Respiratory parameters of chest and abdominal movements were recorded with respiratory inductance plethysmography. Oxygen saturation was recorded by pulse oximetry.  Hypopnea scoring rule used: 1B 4%    Diagnostic PSG  Sleep Architecture: Sleep fragmentation, no REM sleep  The total recording time of the polysomnogram was 158.5 minutes. The total sleep time was 141.5 minutes. Sleep latency was 5.5 minutes. REM latency was - minutes. Arousal index was 119.6 arousals per hour. Sleep efficiency was 89.3%. Wake after sleep onset was 7.0 minutes. The patient spent 31.4% of total sleep time in Stage N1, 68.6% in Stage N2, 0.0% in Stage N3, and 0.0% in REM. Time in REM supine was - minutes.    Respiration: Severe GLENNY with hypoxemia    Events ? The polysomnogram revealed a presence of 236 obstructive, - central, and - mixed apneas resulting in an apnea index of 100.1 events per hour. There were 40 obstructive hypopneas and - central hypopneas resulting in an obstructive hypopnea index of 17.0 and central hypopnea index of - events per hour. The " combined apnea/hypopnea index was 117.0 events per hour (central apnea/hypopnea index was - events per hour).  The REM AHI was - events per hour. The supine AHI was 117.0 events per hour. The RERA index was 2.1 events per hour. The RDI was 119.2 events per hour.    Snoring - was reported as loud.    Respiratory rate and pattern - was notable for normal respiratory rate and pattern.    Sustained Sleep Associated Hypoventilation - Transcutaneous carbon dioxide monitoring was used, however significant hypoventilation was not present with a maximum change from 40.0 to 47.3 mmHg and 0 minutes at or greater than 55 mmHg.    Sleep Associated Hypoxemia - (Greater than 5 minutes O2 sat at or below 88%) was present. Baseline oxygen saturation was 81.9%. Lowest oxygen saturation was 48.2%. Time spent less than or equal to 88% was 86.5 minutes. Time spent less than or equal to 89% was 88.9 minutes.     Treatment PSG  Sleep Architecture: Decreased sleep fragmentation, + REM sleep  At 01:29:20 AM the patient was placed on PAP treatment and was titrated at pressures ranging from CPAP 5 cmH2O up to CPAP 13 cmH2O. The total recording time of the treatment portion of the study was 271.5 minutes. The total sleep time was 241.5 minutes. During the treatment portion of the study the sleep latency was 4.0 minutes. REM latency was 126.0 minutes. Arousal index was 25.1 arousals per hour. Sleep efficiency was 89.0%. Wake after sleep onset was 25.5 minutes. The patient spent 14.5% of total sleep time in Stage N1, 47.4% in Stage N2, 9.9% in Stage N3, and 28.2% in REM. Time in REM supine was 68.0 minutes.     Respiration: Sleep disordered breathing noted on the baseline portion of the study was nearly fully resolved with CPAP therapy.  Of note the patient did have REM supine during this portion of the study.      The final pressure was CPAP 13 cmH2O with an AHI of 6.6 events per hour. Time in REM supine on final pressure was 36.5 minutes.      Movement Activity:     Periodic Limb Movements  o During the diagnostic portion of the study, there were 0 PLMs recorded.  o During the treatment portion of the study, there were 19 PLMs recorded. The PLM index was 4.7 movements per hour. The PLM Arousal Index was 0.7 per hour.    REM EMG Activity - Excessive muscle activity was not present.    Nocturnal Behavior - Abnormal sleep related behaviors were not noted.    Bruxism - None apparent.    Cardiac Summary: Limited 1 lead EKG study with signal frequently obscured by artifact.  Regardless this investigation demonstrated narrow QRS complexes that appeared to be preceded by P waves suggestive of sinus rhythm.  Intermittent tachycardia.  During the diagnostic portion of the study, the average pulse rate was 93.5 bpm. The minimum pulse rate was 83.5 bpm while the maximum pulse rate was 103.1 bpm.    During the treatment portion of the study, the average pulse rate was 88.5 bpm. The minimum pulse rate was 63.9 bpm while the maximum pulse rate was 98.1 bpm.     Assessment:     Severe GLENNY with hypoxemia.     Sleep disordered breathing noted on the baseline portion of the study was nearly fully resolved with CPAP therapy.  Of note the patient did have REM supine during this portion of the study.      Recommendations:    Treatment of GLENNY with Auto?titrating PAP therapy with a range of 5 cmH2O to 15 cmH2O. Recommend clinical follow up with sleep management team, including review of compliance measures.    Advice regarding the risks of drowsy driving.    Suggest optimizing sleep schedule and avoiding sleep deprivation.    Weight management     Diagnostic Codes: G47.33, G47.36      Morales Cormier MD 2-

## 2020-02-24 NOTE — PROGRESS NOTES
I called patient and explained his PSG from last night and urgent need to get started on CPAP right away.     He indicated that he is willing.  I will put and order in and he will follow up with Keo and RACHNA.

## 2020-02-27 ENCOUNTER — DOCUMENTATION ONLY (OUTPATIENT)
Dept: SLEEP MEDICINE | Facility: CLINIC | Age: 30
End: 2020-02-27

## 2020-02-27 NOTE — PROGRESS NOTES
Patient was offered choice of vendor and chose [facility].  Patient Jeovanny Salcedo was set up at [place] on February 27, 2020. Patient received a Resmed AirSense 10 Auto. Pressures were set at 5-15 cm H2O.   Patient s ramp is 5 cm H2O for Auto and FLEX/EPR is 2.  Patient received a Resmed Mask name: [id number]  Full Face mask size Large, heated tubing and heated humidifier.  **WITH USAGE ONLY**Debbi Mcleod

## 2020-03-02 ENCOUNTER — DOCUMENTATION ONLY (OUTPATIENT)
Dept: SLEEP MEDICINE | Facility: CLINIC | Age: 30
End: 2020-03-02

## 2020-03-02 NOTE — PROGRESS NOTES
3 DAY STM VISIT    Diagnostic AHI: 117    PSG    Patient contacted for 3 day STM visit    Confirmed with patient at time of call- N/A Patient is still interested in STM service     Data only recheck unable to leave voicemail.     Replacement device: No  STM ordered by provider: Yes     Device type: Auto-CPAP  PAP settings from order::  CPAP min 5 cm  H20       CPAP max 15 cm  H20         Mask type:    Nasal Mask     Device settings from machine      Min CPAP 5.0            Max CPAP 15.0            Assessment: Nightly usage over four hours.  Action plan: Patient to have 14 day STM visit. Patient has a follow up visit scheduled:   not required by insurance.     Total time spent on accessing and  interpreting remote patient PAP therapy data  10 minutes  Total time spent counseling, coaching  and reviewing PAP therapy data with patient  0 minutes  [id number] no

## 2020-03-17 ENCOUNTER — DOCUMENTATION ONLY (OUTPATIENT)
Dept: SLEEP MEDICINE | Facility: CLINIC | Age: 30
End: 2020-03-17
Payer: COMMERCIAL

## 2020-03-17 NOTE — PROGRESS NOTES
14  DAY STM VISIT    Diagnostic AHI: 117  PSG    Unable to leave message.     Assessment: Pt not meeting objective benchmarks for AHI      Action plan: waiting for patient to return call.  and pt to have 30 day STM visit.      Device type: Auto-CPAP    PAP settings: CPAP min 5.0 cm  H20       CPAP max 15.0 cm  H20      95th% pressure 14.2 cm  H20     Mask type:  Nasal Mask    Objective measures: 14 day rolling measures      Compliance  92 %      Leak  20.56 lpm  last  upload      AHI 12.98   last  upload      Average number of minutes 393      Objective measure goal  Compliance   Goal >70%  Leak   Goal < 24 lpm  AHI  Goal < 5  Usage  Goal >240        Total time spent on accessing and interpreting remote patient PAP therapy data  10 minutes    Total time spent counseling, coaching  and reviewing PAP therapy data with patient  1 minutes    [id number]  no  [id number]  no (3 day STM)

## 2020-04-01 ENCOUNTER — DOCUMENTATION ONLY (OUTPATIENT)
Dept: SLEEP MEDICINE | Facility: CLINIC | Age: 30
End: 2020-04-01

## 2020-04-01 NOTE — PROGRESS NOTES
30 DAY [facility] VISIT    Diagnostic AHI: 117    PSG    Subjective measures:   Patient states he recently had to adjust the mask as it was leaking.  He is feeling benefit from therpay.     Assessment: Pt not meeting objective benchmarks for AHI last two nights AHI was below 5.   Patient meeting subjective benchmarks.   Action plan: 2 week [facility] recheck appt scheduled  Patient has scheduled a follow up visit with Bennett Goltz, PA-C on 5/9/20.   Device type: Auto-CPAP  PAP settings: CPAP min 5.0 cm  H20     CPAP max 15.0 cm  H20        95th% pressure 14.9 cm  H20   Mask type:  Nasal Mask  Objective measures: 14 day rolling measures      Compliance  85 %      Leak  16.4 lpm  last  upload      AHI 56.65   last  upload      Average number of minutes 354      Objective measure goal  Compliance   Goal >70%  Leak   Goal < 24 lpm  AHI  Goal < 5  Usage  Goal >240        Total time spent on accessing and interpreting remote patient PAP therapy data  12 minutes    Total time spent counseling, coaching  and reviewing PAP therapy data with patient  5 minutes     [id number] no this call  [id number] no  at 3 or 14 day [facility]

## 2020-04-15 ENCOUNTER — DOCUMENTATION ONLY (OUTPATIENT)
Dept: SLEEP MEDICINE | Facility: CLINIC | Age: 30
End: 2020-04-15
Payer: COMMERCIAL

## 2020-05-07 VITALS — BODY MASS INDEX: 50.62 KG/M2 | WEIGHT: 315 LBS | HEIGHT: 66 IN

## 2020-05-07 DIAGNOSIS — I10 HYPERTENSION GOAL BP (BLOOD PRESSURE) < 140/90: ICD-10-CM

## 2020-05-07 ASSESSMENT — MIFFLIN-ST. JEOR: SCORE: 2490.34

## 2020-05-07 NOTE — PATIENT INSTRUCTIONS
Your BMI is Body mass index is 56.49 kg/m .  Weight management is a personal decision.  If you are interested in exploring weight loss strategies, the following discussion covers the approaches that may be successful. Body mass index (BMI) is one way to tell whether you are at a healthy weight, overweight, or obese. It measures your weight in relation to your height.  A BMI of 18.5 to 24.9 is in the healthy range. A person with a BMI of 25 to 29.9 is considered overweight, and someone with a BMI of 30 or greater is considered obese. More than two-thirds of [place] adults are considered overweight or obese.  Being overweight or obese increases the risk for further weight gain. Excess weight may lead to heart disease and diabetes.  Creating and following plans for healthy eating and physical activity may help you improve your health.  Weight control is part of healthy lifestyle and includes exercise, emotional health, and healthy eating habits. Careful eating habits lifelong are the mainstay of weight control. Though there are significant health benefits from weight loss, long-term weight loss with diet alone may be very difficult to achieve- studies show long-term success with dietary management in less than 10% of people. Attaining a healthy weight may be especially difficult to achieve in those with severe obesity. In some cases, medications, devices and surgical management might be considered.  What can you do?  If you are overweight or obese and are interested in methods for weight loss, you should discuss this with your provider.     Consider reducing daily calorie intake by 500 calories.     Keep a food journal.     Avoiding skipping meals, consider cutting portions instead.    Diet combined with exercise helps maintain muscle while optimizing fat loss. Strength training is particularly important for building and maintaining muscle mass. Exercise helps reduce stress, increase energy, and improves fitness.  Increasing exercise without diet control, however, may not burn enough calories to loose weight.       Start walking three days a week 10-20 minutes at a time    Work towards walking thirty minutes five days a week     Eventually, increase the speed of your walking for 1-2 minutes at time    In addition, we recommend that you review healthy lifestyles and methods for weight loss available through the National Institutes of Health patient information sites:  http://win.niddk.nih.gov/publications/index.htm    And look into health and wellness programs that may be available through your health insurance provider, employer, local community center, or [employer].    Weight management plan: Patient was referred to their PCP to discuss a diet and exercise plan.

## 2020-05-07 NOTE — PROGRESS NOTES
"Jeovanny Salcedo is a 29 year old male who is being evaluated via a billable telephone visit.      The patient has been notified of following:     \"This telephone visit will be conducted via a call between you and your physician/provider. We have found that certain health care needs can be provided without the need for a physical exam.  This service lets us provide the care you need with a short phone conversation.  If a prescription is necessary we can send it directly to your pharmacy.  If lab work is needed we can place an order for that and you can then stop by our lab to have the test done at a later time.    Telephone visits are billed at different rates depending on your insurance coverage. During this emergency period, for some insurers they may be billed the same as an in-person visit.  Please reach out to your insurance provider with any questions.    If during the course of the call the physician/provider feels a telephone visit is not appropriate, you will not be charged for this service.\"    Patient has given verbal consent for Telephone visit?  Yes    What phone number would you like to be contacted at? [phone]  How would you like to obtain your AVS? Mail a copy    CPAP Follow-Up Visit:    Date on this visit: 5/8/2020    Jeovanny Salcedo has a follow-up visit today to review his CPAP use for GLENNY. He was initially seen at the [facility] because he says he has sleep apnea for 2 years. His medical history is significant for HTN, depression, panic disorder, morbid obesity.     Previous Study Results:   Date: 2/23/2020.  Weight 351 pounds.  AHI: 117/hr. /hr. O2 jak 48%. 88.9 minutes below 89%  CO2 ranged 40-47 mmHg.  CPAP 13 cm was largely effective, residual AHI of 6.6/hr    He is comfortable with the CPAP now. He is sleeping much better with CPAP.     PAP machine: ResMed. Pressure settings: 5-15 cm    The compliance data shows that the patient used the CPAP for 28/30 " nights, 71% of nights for >4 hours (in last 14 days).  The 95th% pressure is 14.9 cm.  The 95th% leak is 1.7 lpm.  The average nightly usage is 387 min.  The average AHI is 29/hr (almost all hypopneas).       Interface:  Mask: F20 full face mask  Chin strap: No  Leak: No  Using Humidifier: Yes  Condensation in hose or mask: No     Difficulties with therapy:    [-] Snoring with CPAP: He does not think so, but does not have a bed partner.  [-] Difficulty tolerating the pressure:   [-] Epistaxis/dry nose:   [-] Nasal congestion:  [-] Dry mouth: once in a while  [+] Mouth breathing:   [-] Pain/skin breakdown:      Improvements noted with CPAP:   [+] waking up more refreshed  [+] sleeping better with less arousals  [+] nocturia improved   [+] improved energy level during the day    Weight change since sleep study:     Sleep schedule was: midnight to 8-9 AM.  Since the COVID pandemic, his sleep schedule is irregular. He goes to bed later and naps in the day.   He uses zolpidem 5 mg, melatonin 3 mg and mirtazapine 7.5 mg, gabapentin 1200 mg. He sometimes has difficulty getting comfortable trying to fall asleep with a mask on his face. Once he gets to sleep, he is usually pretty comfortable with the mask.     Past medical/surgical history, family history, social history, medications and allergies were reviewed.      Problem List:  Patient Active Problem List    Diagnosis Date Noted     overweight HCC 04/18/2019     Priority: Medium     Hypertension goal BP (blood pressure) < 140/90 04/18/2019     Priority: Medium     Severe major depression (H) 04/18/2019     Priority: Medium     Panic disorder with agoraphobia 04/18/2019     Priority: Medium        Impression/Plan:    (G47.33) GLENNY (obstructive sleep apnea)  (primary encounter diagnosis), (G47.34) Nocturnal hypoxemia  Comment: He is doing fairly well with CPAP. Compliance is borderline. AHI is elevated >20/hr with mostly hypopneas.  Plan: Overnight oximetry study,  Comprehensive DME        Order placed to change pressure settings to 9-17 cm. Overnight oximetry ordered on the new settings. We reviewed his sleep study results to emphasize the severity of his apnea and he was advised to wear the CPAP whenever sleeping. We reviewed recommendations for cleaning and replacing supplies.       (F51.04) Chronic insomnia  Comment: With the COVID pandemic, his sleep schedule has gotten very irregular, staying up late and napping often. He still uses zolpidem 5 mg, melatonin 3 mg and mirtazapine 7.5 mg, gabapentin 1200 mg to help him get to sleep. He feels a little uncomfortable falling asleep with the mask on.  Plan: He was encouraged to try to stick to his old sleep schedule, which was 12 to 8-9 AM. Try to avoid naps. Stay active and avoid sedentary situations that might predispose you to feeling sleepy.       He will follow up with me in about 2 month(s).     21 minutes (2:01 PM-2:22 PM) spent with patient, all of which were spent face-to-face counseling, consulting, coordinating plan of care.      Bennett Goltz, PA-C    CC: No ref. provider found

## 2020-05-08 ENCOUNTER — VIRTUAL VISIT (OUTPATIENT)
Dept: SLEEP MEDICINE | Facility: CLINIC | Age: 30
End: 2020-05-08
Payer: COMMERCIAL

## 2020-05-08 DIAGNOSIS — G47.33 OSA (OBSTRUCTIVE SLEEP APNEA): Primary | ICD-10-CM

## 2020-05-08 DIAGNOSIS — G47.34 NOCTURNAL HYPOXEMIA: ICD-10-CM

## 2020-05-08 DIAGNOSIS — F51.04 CHRONIC INSOMNIA: ICD-10-CM

## 2020-05-08 PROCEDURE — 99213 OFFICE O/P EST LOW 20 MIN: CPT | Mod: 95 | Performed by: PHYSICIAN ASSISTANT

## 2020-05-08 RX ORDER — LISINOPRIL AND HYDROCHLOROTHIAZIDE 20; 25 MG/1; MG/1
1 TABLET ORAL DAILY
Qty: 90 TABLET | Refills: 1 | OUTPATIENT
Start: 2020-05-08

## 2020-05-08 NOTE — TELEPHONE ENCOUNTER
Refill too soon, should have 1 refill (3 month supply) remaining at pharmacy.    Samira Orosco, Pharm.D., [facility]  Medication Therapy Management Pharmacist  [phone]

## 2020-07-09 DIAGNOSIS — I10 HYPERTENSION GOAL BP (BLOOD PRESSURE) < 140/90: ICD-10-CM

## 2020-07-09 RX ORDER — LISINOPRIL AND HYDROCHLOROTHIAZIDE 20; 25 MG/1; MG/1
TABLET ORAL
Qty: 90 TABLET | Refills: 1 | Status: SHIPPED | OUTPATIENT
Start: 2020-07-09 | End: 2021-04-07

## 2020-07-09 NOTE — TELEPHONE ENCOUNTER
Routing refill request to provider for review/approval because:  Labs not current:  RONAK SMITH, NA    Bonita Johnston RN on 7/9/2020 at 9:57 AM

## 2021-10-19 PROBLEM — F32.9 MAJOR DEPRESSION: Status: ACTIVE | Noted: 2019-04-18

## 2022-06-17 DIAGNOSIS — G47.33 OBSTRUCTIVE SLEEP APNEA (ADULT) (PEDIATRIC): Primary | ICD-10-CM

## 2022-06-21 ENCOUNTER — DOCUMENTATION ONLY (OUTPATIENT)
Dept: SLEEP MEDICINE | Facility: CLINIC | Age: 32
End: 2022-06-21

## 2022-06-21 NOTE — PROGRESS NOTES
PT WAS A NO CALL NO SHOW FOR SCHEDULED TROUBLESHOOT ON 6/21/2022 IN [facility].